# Patient Record
Sex: FEMALE | Race: OTHER | HISPANIC OR LATINO | URBAN - METROPOLITAN AREA
[De-identification: names, ages, dates, MRNs, and addresses within clinical notes are randomized per-mention and may not be internally consistent; named-entity substitution may affect disease eponyms.]

---

## 2019-09-20 ENCOUNTER — EMERGENCY (EMERGENCY)
Facility: HOSPITAL | Age: 24
LOS: 1 days | Discharge: HOME | End: 2019-09-20
Attending: STUDENT IN AN ORGANIZED HEALTH CARE EDUCATION/TRAINING PROGRAM
Payer: MEDICAID

## 2019-09-20 VITALS
WEIGHT: 190.04 LBS | HEIGHT: 67 IN | SYSTOLIC BLOOD PRESSURE: 116 MMHG | OXYGEN SATURATION: 99 % | HEART RATE: 98 BPM | DIASTOLIC BLOOD PRESSURE: 76 MMHG | TEMPERATURE: 98 F | RESPIRATION RATE: 16 BRPM

## 2019-09-20 LAB
APPEARANCE UR: ABNORMAL
BILIRUB UR-MCNC: NEGATIVE — SIGNIFICANT CHANGE UP
COLOR SPEC: YELLOW — SIGNIFICANT CHANGE UP
DIFF PNL FLD: ABNORMAL
GLUCOSE UR QL: NEGATIVE — SIGNIFICANT CHANGE UP
HCG UR QL: NEGATIVE — SIGNIFICANT CHANGE UP
KETONES UR-MCNC: NEGATIVE — SIGNIFICANT CHANGE UP
LEUKOCYTE ESTERASE UR-ACNC: NEGATIVE — SIGNIFICANT CHANGE UP
NITRITE UR-MCNC: NEGATIVE — SIGNIFICANT CHANGE UP
PH UR: 6 — SIGNIFICANT CHANGE UP (ref 5–8)
PROT UR-MCNC: 30 MG/DL
SP GR SPEC: 1.02 — SIGNIFICANT CHANGE UP (ref 1.01–1.02)
UROBILINOGEN FLD QL: NEGATIVE — SIGNIFICANT CHANGE UP

## 2019-09-20 PROCEDURE — 81001 URINALYSIS AUTO W/SCOPE: CPT

## 2019-09-20 PROCEDURE — 81025 URINE PREGNANCY TEST: CPT

## 2019-09-20 PROCEDURE — 73590 X-RAY EXAM OF LOWER LEG: CPT | Mod: 26,LT

## 2019-09-20 PROCEDURE — 73630 X-RAY EXAM OF FOOT: CPT

## 2019-09-20 PROCEDURE — 73610 X-RAY EXAM OF ANKLE: CPT | Mod: 26,LT

## 2019-09-20 PROCEDURE — 73590 X-RAY EXAM OF LOWER LEG: CPT

## 2019-09-20 PROCEDURE — 99284 EMERGENCY DEPT VISIT MOD MDM: CPT

## 2019-09-20 PROCEDURE — 73610 X-RAY EXAM OF ANKLE: CPT

## 2019-09-20 PROCEDURE — 73630 X-RAY EXAM OF FOOT: CPT | Mod: 26,LT

## 2019-09-20 PROCEDURE — 99284 EMERGENCY DEPT VISIT MOD MDM: CPT | Mod: 25

## 2019-09-20 RX ORDER — ACETAMINOPHEN 500 MG
650 TABLET ORAL ONCE
Refills: 0 | Status: COMPLETED | OUTPATIENT
Start: 2019-09-20 | End: 2019-09-20

## 2019-09-20 RX ORDER — IBUPROFEN 200 MG
1 TABLET ORAL
Qty: 28 | Refills: 0
Start: 2019-09-20 | End: 2019-09-26

## 2019-09-20 RX ORDER — ONDANSETRON 8 MG/1
4 TABLET, FILM COATED ORAL ONCE
Refills: 0 | Status: COMPLETED | OUTPATIENT
Start: 2019-09-20 | End: 2019-09-20

## 2019-09-20 RX ORDER — IBUPROFEN 200 MG
600 TABLET ORAL ONCE
Refills: 0 | Status: COMPLETED | OUTPATIENT
Start: 2019-09-20 | End: 2019-09-20

## 2019-09-20 RX ADMIN — ONDANSETRON 4 MILLIGRAM(S): 8 TABLET, FILM COATED ORAL at 08:30

## 2019-09-20 RX ADMIN — Medication 650 MILLIGRAM(S): at 08:30

## 2019-09-20 RX ADMIN — Medication 600 MILLIGRAM(S): at 08:29

## 2019-09-20 NOTE — ED PROVIDER NOTE - CLINICAL SUMMARY MEDICAL DECISION MAKING FREE TEXT BOX
24 y/o F with pmhx of PCOS presents to ED complaining of ankle pain and no other injury. Will treat pain and will obtain X-ray and Upreg and will reassess.

## 2019-09-20 NOTE — ED PROVIDER NOTE - PATIENT PORTAL LINK FT
You can access the FollowMyHealth Patient Portal offered by [facility] by registering at the following website: [email or web address]. By joining [employer]’s FollowMyHealth portal, you will also be able to view your health information using other applications (apps) compatible with our system.

## 2019-09-20 NOTE — ED PROVIDER NOTE - PROGRESS NOTE DETAILS
xray reviewed, no fracture noted. patient ambulatory. given pain med and ortho f.u instruction. return precaution instructed

## 2020-11-04 ENCOUNTER — EMERGENCY (EMERGENCY)
Facility: HOSPITAL | Age: 25
LOS: 1 days | Discharge: HOME | End: 2020-11-04
Attending: EMERGENCY MEDICINE
Payer: MEDICAID

## 2020-11-04 VITALS
RESPIRATION RATE: 16 BRPM | OXYGEN SATURATION: 100 % | HEART RATE: 84 BPM | HEIGHT: 67 IN | SYSTOLIC BLOOD PRESSURE: 134 MMHG | TEMPERATURE: 98 F | DIASTOLIC BLOOD PRESSURE: 88 MMHG | WEIGHT: 199.96 LBS

## 2020-11-04 PROBLEM — E28.2 POLYCYSTIC OVARIAN SYNDROME: Chronic | Status: ACTIVE | Noted: 2019-09-20

## 2020-11-04 LAB
ANION GAP SERPL CALC-SCNC: 6 MMOL/L — SIGNIFICANT CHANGE UP (ref 5–17)
BASOPHILS # BLD AUTO: 0.05 K/UL — SIGNIFICANT CHANGE UP (ref 0–0.2)
BASOPHILS NFR BLD AUTO: 0.5 % — SIGNIFICANT CHANGE UP (ref 0–2)
BUN SERPL-MCNC: 15 MG/DL — SIGNIFICANT CHANGE UP (ref 7–18)
CALCIUM SERPL-MCNC: 8.3 MG/DL — LOW (ref 8.4–10.5)
CHLORIDE SERPL-SCNC: 105 MMOL/L — SIGNIFICANT CHANGE UP (ref 96–108)
CO2 SERPL-SCNC: 28 MMOL/L — SIGNIFICANT CHANGE UP (ref 22–31)
CREAT SERPL-MCNC: 0.96 MG/DL — SIGNIFICANT CHANGE UP (ref 0.5–1.3)
EOSINOPHIL # BLD AUTO: 0.06 K/UL — SIGNIFICANT CHANGE UP (ref 0–0.5)
EOSINOPHIL NFR BLD AUTO: 0.6 % — SIGNIFICANT CHANGE UP (ref 0–6)
GLUCOSE SERPL-MCNC: 94 MG/DL — SIGNIFICANT CHANGE UP (ref 70–99)
HCT VFR BLD CALC: 41.1 % — SIGNIFICANT CHANGE UP (ref 34.5–45)
HGB BLD-MCNC: 13.2 G/DL — SIGNIFICANT CHANGE UP (ref 11.5–15.5)
IMM GRANULOCYTES NFR BLD AUTO: 0.1 % — SIGNIFICANT CHANGE UP (ref 0–1.5)
LYMPHOCYTES # BLD AUTO: 3.19 K/UL — SIGNIFICANT CHANGE UP (ref 1–3.3)
LYMPHOCYTES # BLD AUTO: 33.6 % — SIGNIFICANT CHANGE UP (ref 13–44)
MCHC RBC-ENTMCNC: 28 PG — SIGNIFICANT CHANGE UP (ref 27–34)
MCHC RBC-ENTMCNC: 32.1 GM/DL — SIGNIFICANT CHANGE UP (ref 32–36)
MCV RBC AUTO: 87.3 FL — SIGNIFICANT CHANGE UP (ref 80–100)
MONOCYTES # BLD AUTO: 0.59 K/UL — SIGNIFICANT CHANGE UP (ref 0–0.9)
MONOCYTES NFR BLD AUTO: 6.2 % — SIGNIFICANT CHANGE UP (ref 2–14)
NEUTROPHILS # BLD AUTO: 5.59 K/UL — SIGNIFICANT CHANGE UP (ref 1.8–7.4)
NEUTROPHILS NFR BLD AUTO: 59 % — SIGNIFICANT CHANGE UP (ref 43–77)
NRBC # BLD: 0 /100 WBCS — SIGNIFICANT CHANGE UP (ref 0–0)
PLATELET # BLD AUTO: 269 K/UL — SIGNIFICANT CHANGE UP (ref 150–400)
POTASSIUM SERPL-MCNC: 3.7 MMOL/L — SIGNIFICANT CHANGE UP (ref 3.5–5.3)
POTASSIUM SERPL-SCNC: 3.7 MMOL/L — SIGNIFICANT CHANGE UP (ref 3.5–5.3)
RBC # BLD: 4.71 M/UL — SIGNIFICANT CHANGE UP (ref 3.8–5.2)
RBC # FLD: 14 % — SIGNIFICANT CHANGE UP (ref 10.3–14.5)
SODIUM SERPL-SCNC: 139 MMOL/L — SIGNIFICANT CHANGE UP (ref 135–145)
WBC # BLD: 9.49 K/UL — SIGNIFICANT CHANGE UP (ref 3.8–10.5)
WBC # FLD AUTO: 9.49 K/UL — SIGNIFICANT CHANGE UP (ref 3.8–10.5)

## 2020-11-04 PROCEDURE — 80048 BASIC METABOLIC PNL TOTAL CA: CPT

## 2020-11-04 PROCEDURE — 99283 EMERGENCY DEPT VISIT LOW MDM: CPT

## 2020-11-04 PROCEDURE — 36415 COLL VENOUS BLD VENIPUNCTURE: CPT

## 2020-11-04 PROCEDURE — 85025 COMPLETE CBC W/AUTO DIFF WBC: CPT

## 2020-11-04 NOTE — ED PROVIDER NOTE - OBJECTIVE STATEMENT
26 y/o F patient with a significant PMHx of PCOS and no significant PSHx presents to the ED with x3 episodes of rectal bleeding. Patient describes her bleeding as a flow. Patient notes a history of constipation but says she hasn't been constipated in the past week. Patient endorses frequent lightheadedness. Patient denies trauma to the rectum and any other complaints. Patient denies being Rx blood thinners. NKDA.

## 2020-11-04 NOTE — ED PROVIDER NOTE - NSFOLLOWUPINSTRUCTIONS_ED_ALL_ED_FT
Call Patient     Hemorrhoids    WHAT YOU NEED TO KNOW:    Hemorrhoids are swollen blood vessels inside your rectum (internal hemorrhoids) or on your anus (external hemorrhoids). Sometimes a hemorrhoid may prolapse. This means it extends out of your anus.    DISCHARGE INSTRUCTIONS:    Seek care immediately if:   •You have severe pain in your rectum or around your anus.      •You have severe pain in your abdomen and you are vomiting.       •You have bleeding from your anus that soaks through your underwear.       Contact your healthcare provider if:   •You have frequent and painful bowel movements.      •Your hemorrhoid looks or feels more swollen than usual.       •You do not have a bowel movement for 2 days or more.       •You see or feel tissue coming through your anus.       •You have questions or concerns about your condition or care.      Medicines: You may need any of the following:   •Medicine may be given to decrease pain, swelling, and itching. The medicine may come as a pad, cream, or ointment.       •Stool softeners help treat or prevent constipation.       •NSAIDs, such as ibuprofen, help decrease swelling, pain, and fever. NSAIDs can cause stomach bleeding or kidney problems in certain people. If you take blood thinner medicine, always ask your healthcare provider if NSAIDs are safe for you. Always read the medicine label and follow directions.      •Take your medicine as directed. Contact your healthcare provider if you think your medicine is not helping or if you have side effects. Tell him or her if you are allergic to any medicine. Keep a list of the medicines, vitamins, and herbs you take. Include the amounts, and when and why you take them. Bring the list or the pill bottles to follow-up visits. Carry your medicine list with you in case of an emergency.      Manage your symptoms:   •Apply ice on your anus for 15 to 20 minutes every hour or as directed. Use an ice pack, or put crushed ice in a plastic bag. Cover it with a towel before you apply it to your anus. Ice helps prevent tissue damage and decreases swelling and pain.      •Take a sitz bath. Fill a bathtub with 4 to 6 inches of warm water. You may also use a sitz bath pan that fits inside a toilet bowl. Sit in the sitz bath for 15 minutes. Do this 3 times a day, and after each bowel movement. The warm water can help decrease pain and swelling.       •Keep your anal area clean. Gently wash the area with warm water daily. Soap may irritate the area. After a bowel movement, wipe with moist towelettes or wet toilet paper. Dry toilet paper can irritate the area.       Prevent hemorrhoids:   •Do not strain to have a bowel movement. Do not sit on the toilet too long. These actions can increase pressure on the tissues in your rectum and anus.       •Drink plenty of liquids. Liquids can help prevent constipation. Ask how much liquid to drink each day and which liquids are best for you.       •Eat a variety of high-fiber foods. Examples include fruits, vegetables, and whole grains. Ask your healthcare provider how much fiber you need each day. You may need to take a fiber supplement.              •Exercise as directed. Exercise, such as walking, may make it easier to have a bowel movement. Ask your healthcare provider to help you create an exercise plan.       •Do not have anal sex. Anal sex can weaken the skin around your rectum and anus.       •Avoid heavy lifting. This can cause straining and increase your risk for another hemorrhoid.       Follow up with your healthcare provider as directed: Write down your questions so you remember to ask them during your visits.

## 2020-11-04 NOTE — ED ADULT TRIAGE NOTE - CHIEF COMPLAINT QUOTE
C/o I had a BM yesterday and I saw a lot of blood, it occurred again this morning. Denies abdominal pain

## 2020-11-04 NOTE — ED PROVIDER NOTE - CLINICAL SUMMARY MEDICAL DECISION MAKING FREE TEXT BOX
Labs and consult GI for quick outpatient follow up appointment. Pt vitals within normal limits, unlikely intestinal bleed.

## 2020-11-04 NOTE — ED ADULT NURSE NOTE - OBJECTIVE STATEMENT
Pt. c/o bloody stools. Pt. stated that when she goes to the bathroom there "is a flow of blood" that happened 3 times. Pt. stated that she feels a little light headed. Pt. able to ambulate with steady gait.

## 2020-11-04 NOTE — ED PROVIDER NOTE - CARE PROVIDER_API CALL
Prabhu Tejada)  Medicine  [address] [place] [address], [address] [place] [address]  Phone: [phone]  Fax: [phone]  Follow Up Time:

## 2020-11-04 NOTE — ED PROVIDER NOTE - PATIENT PORTAL LINK FT
You can access the FollowMyHealth Patient Portal offered by [facility] by registering at the following website: [email or web address]. By joining [employer]’s FollowMyHealth portal, you will also be able to view your health information using other applications (apps) compatible with our system.

## 2021-04-15 ENCOUNTER — EMERGENCY (EMERGENCY)
Facility: HOSPITAL | Age: 26
LOS: 1 days | Discharge: HOME | End: 2021-04-15
Attending: EMERGENCY MEDICINE
Payer: MEDICAID

## 2021-04-15 VITALS
HEIGHT: 67 IN | OXYGEN SATURATION: 97 % | HEART RATE: 81 BPM | DIASTOLIC BLOOD PRESSURE: 76 MMHG | SYSTOLIC BLOOD PRESSURE: 122 MMHG | RESPIRATION RATE: 17 BRPM | TEMPERATURE: 98 F

## 2021-04-15 LAB
ALBUMIN SERPL ELPH-MCNC: 3.4 G/DL — LOW (ref 3.5–5)
ALP SERPL-CCNC: 112 U/L — SIGNIFICANT CHANGE UP (ref 40–120)
ALT FLD-CCNC: 55 U/L DA — SIGNIFICANT CHANGE UP (ref 10–60)
ANION GAP SERPL CALC-SCNC: 6 MMOL/L — SIGNIFICANT CHANGE UP (ref 5–17)
APPEARANCE UR: CLEAR — SIGNIFICANT CHANGE UP
AST SERPL-CCNC: 25 U/L — SIGNIFICANT CHANGE UP (ref 10–40)
BACTERIA # UR AUTO: ABNORMAL /HPF
BILIRUB SERPL-MCNC: 0.4 MG/DL — SIGNIFICANT CHANGE UP (ref 0.2–1.2)
BILIRUB UR-MCNC: NEGATIVE — SIGNIFICANT CHANGE UP
BUN SERPL-MCNC: 7 MG/DL — SIGNIFICANT CHANGE UP (ref 7–18)
CALCIUM SERPL-MCNC: 9.5 MG/DL — SIGNIFICANT CHANGE UP (ref 8.4–10.5)
CHLORIDE SERPL-SCNC: 102 MMOL/L — SIGNIFICANT CHANGE UP (ref 96–108)
CO2 SERPL-SCNC: 28 MMOL/L — SIGNIFICANT CHANGE UP (ref 22–31)
COLOR SPEC: YELLOW — SIGNIFICANT CHANGE UP
CREAT SERPL-MCNC: 0.78 MG/DL — SIGNIFICANT CHANGE UP (ref 0.5–1.3)
DIFF PNL FLD: ABNORMAL
EPI CELLS # UR: ABNORMAL /HPF
GLUCOSE SERPL-MCNC: 103 MG/DL — HIGH (ref 70–99)
GLUCOSE UR QL: NEGATIVE — SIGNIFICANT CHANGE UP
HCG UR QL: NEGATIVE — SIGNIFICANT CHANGE UP
HCT VFR BLD CALC: 45.5 % — HIGH (ref 34.5–45)
HGB BLD-MCNC: 14.7 G/DL — SIGNIFICANT CHANGE UP (ref 11.5–15.5)
KETONES UR-MCNC: ABNORMAL
LEUKOCYTE ESTERASE UR-ACNC: NEGATIVE — SIGNIFICANT CHANGE UP
MCHC RBC-ENTMCNC: 28.3 PG — SIGNIFICANT CHANGE UP (ref 27–34)
MCHC RBC-ENTMCNC: 32.3 GM/DL — SIGNIFICANT CHANGE UP (ref 32–36)
MCV RBC AUTO: 87.5 FL — SIGNIFICANT CHANGE UP (ref 80–100)
NITRITE UR-MCNC: NEGATIVE — SIGNIFICANT CHANGE UP
NRBC # BLD: 0 /100 WBCS — SIGNIFICANT CHANGE UP (ref 0–0)
PH UR: 7 — SIGNIFICANT CHANGE UP (ref 5–8)
PLATELET # BLD AUTO: 267 K/UL — SIGNIFICANT CHANGE UP (ref 150–400)
POTASSIUM SERPL-MCNC: 4.3 MMOL/L — SIGNIFICANT CHANGE UP (ref 3.5–5.3)
POTASSIUM SERPL-SCNC: 4.3 MMOL/L — SIGNIFICANT CHANGE UP (ref 3.5–5.3)
PROT SERPL-MCNC: 7.8 G/DL — SIGNIFICANT CHANGE UP (ref 6–8.3)
PROT UR-MCNC: NEGATIVE — SIGNIFICANT CHANGE UP
RBC # BLD: 5.2 M/UL — SIGNIFICANT CHANGE UP (ref 3.8–5.2)
RBC # FLD: 13.6 % — SIGNIFICANT CHANGE UP (ref 10.3–14.5)
RBC CASTS # UR COMP ASSIST: SIGNIFICANT CHANGE UP /HPF (ref 0–2)
SARS-COV-2 RNA SPEC QL NAA+PROBE: SIGNIFICANT CHANGE UP
SODIUM SERPL-SCNC: 136 MMOL/L — SIGNIFICANT CHANGE UP (ref 135–145)
SP GR SPEC: 1.01 — SIGNIFICANT CHANGE UP (ref 1.01–1.02)
UROBILINOGEN FLD QL: NEGATIVE — SIGNIFICANT CHANGE UP
WBC # BLD: 13.59 K/UL — HIGH (ref 3.8–10.5)
WBC # FLD AUTO: 13.59 K/UL — HIGH (ref 3.8–10.5)
WBC UR QL: SIGNIFICANT CHANGE UP /HPF (ref 0–5)

## 2021-04-15 PROCEDURE — 85027 COMPLETE CBC AUTOMATED: CPT

## 2021-04-15 PROCEDURE — 99283 EMERGENCY DEPT VISIT LOW MDM: CPT

## 2021-04-15 PROCEDURE — 81001 URINALYSIS AUTO W/SCOPE: CPT

## 2021-04-15 PROCEDURE — 87635 SARS-COV-2 COVID-19 AMP PRB: CPT

## 2021-04-15 PROCEDURE — 36415 COLL VENOUS BLD VENIPUNCTURE: CPT

## 2021-04-15 PROCEDURE — 99284 EMERGENCY DEPT VISIT MOD MDM: CPT

## 2021-04-15 PROCEDURE — 80053 COMPREHEN METABOLIC PANEL: CPT

## 2021-04-15 PROCEDURE — 81025 URINE PREGNANCY TEST: CPT

## 2021-04-15 RX ORDER — IBUPROFEN 200 MG
600 TABLET ORAL ONCE
Refills: 0 | Status: COMPLETED | OUTPATIENT
Start: 2021-04-15 | End: 2021-04-15

## 2021-04-15 RX ORDER — CEPHALEXIN 500 MG
1 CAPSULE ORAL
Qty: 21 | Refills: 0
Start: 2021-04-15 | End: 2021-04-21

## 2021-04-15 RX ORDER — IBUPROFEN 200 MG
1 TABLET ORAL
Qty: 21 | Refills: 0
Start: 2021-04-15 | End: 2021-04-21

## 2021-04-15 RX ADMIN — Medication 600 MILLIGRAM(S): at 13:17

## 2021-04-15 NOTE — ED PROVIDER NOTE - OBJECTIVE STATEMENT
24 y/o female with no significant PMHx presents to the ED c/o COVID-like Sx x yesterday. Pt notes over 24 hours of frontal HA, lower back pain, neck pain, myalgia, fatigue, ear pain and diarrhea. Pt notes last COVID test x 2 months ago, negative. Pt denies fever, chills, or any other complaints. NKDA.

## 2021-04-15 NOTE — ED PROVIDER NOTE - HIV OFFER
Left message on voice mail for pt to call clinic back  
Pt left message on voice mail   surgery on 2/26   Has been on depo provera to stop menstrual cycles, will she still need to continue this  
Previously Declined (within the last year)

## 2021-04-15 NOTE — ED PROVIDER NOTE - CROS ED NEURO POS
40 y/o Female with a PMHx of left ovarian cyst in December 2019presents to the ED c/o right groin pain radiating to her right back this morning when she woke up. She had an acupuncture appointment this morning which she went to. She felt better afterwards but then felt pain again. Endorses nausea which has subsided, denies fever, chills, vaginal discharge, and dysuria. She took Tylenol at 10am. Of note, patient attends acupuncture sessions for fertility reasons, she is not taking any fertility medications. fatigue/HEADACHE

## 2021-04-15 NOTE — ED PROVIDER NOTE - CLINICAL SUMMARY MEDICAL DECISION MAKING FREE TEXT BOX
25F with HA, diarrhea, myalgia x yesterday. R/o COVID. COVID screening, basic labs and reeval. 25F with HA, diarrhea, myalgia x yesterday. R/o COVID. COVID screening, basic labs and reeval.will treat for uti

## 2021-04-15 NOTE — ED PROVIDER NOTE - PATIENT PORTAL LINK FT
You can access the FollowMyHealth Patient Portal offered by [facility] by registering at the following website: [email or web address]. By joining [employer]’s FollowMyHealth portal, you will also be able to view your health information using other applications (apps) compatible with our system.

## 2021-11-01 ENCOUNTER — OUTPATIENT (OUTPATIENT)
Dept: OUTPATIENT SERVICES | Facility: HOSPITAL | Age: 26
LOS: 1 days | End: 2021-11-01
Payer: MEDICAID

## 2021-11-06 ENCOUNTER — EMERGENCY (EMERGENCY)
Facility: HOSPITAL | Age: 26
LOS: 1 days | Discharge: HOME | End: 2021-11-06
Attending: EMERGENCY MEDICINE
Payer: MEDICAID

## 2021-11-06 VITALS
DIASTOLIC BLOOD PRESSURE: 72 MMHG | OXYGEN SATURATION: 99 % | SYSTOLIC BLOOD PRESSURE: 107 MMHG | RESPIRATION RATE: 18 BRPM | TEMPERATURE: 98 F | HEART RATE: 76 BPM

## 2021-11-06 VITALS
TEMPERATURE: 98 F | SYSTOLIC BLOOD PRESSURE: 116 MMHG | OXYGEN SATURATION: 98 % | RESPIRATION RATE: 16 BRPM | HEIGHT: 67 IN | DIASTOLIC BLOOD PRESSURE: 78 MMHG | HEART RATE: 86 BPM

## 2021-11-06 LAB
ALBUMIN SERPL ELPH-MCNC: 3.4 G/DL — LOW (ref 3.5–5)
ALP SERPL-CCNC: 94 U/L — SIGNIFICANT CHANGE UP (ref 40–120)
ALT FLD-CCNC: 18 U/L DA — SIGNIFICANT CHANGE UP (ref 10–60)
ANION GAP SERPL CALC-SCNC: 5 MMOL/L — SIGNIFICANT CHANGE UP (ref 5–17)
AST SERPL-CCNC: 11 U/L — SIGNIFICANT CHANGE UP (ref 10–40)
BASOPHILS # BLD AUTO: 0.06 K/UL — SIGNIFICANT CHANGE UP (ref 0–0.2)
BASOPHILS NFR BLD AUTO: 0.6 % — SIGNIFICANT CHANGE UP (ref 0–2)
BILIRUB SERPL-MCNC: 0.2 MG/DL — SIGNIFICANT CHANGE UP (ref 0.2–1.2)
BUN SERPL-MCNC: 12 MG/DL — SIGNIFICANT CHANGE UP (ref 7–18)
CALCIUM SERPL-MCNC: 9.3 MG/DL — SIGNIFICANT CHANGE UP (ref 8.4–10.5)
CHLORIDE SERPL-SCNC: 108 MMOL/L — SIGNIFICANT CHANGE UP (ref 96–108)
CO2 SERPL-SCNC: 27 MMOL/L — SIGNIFICANT CHANGE UP (ref 22–31)
CREAT SERPL-MCNC: 0.79 MG/DL — SIGNIFICANT CHANGE UP (ref 0.5–1.3)
D DIMER BLD IA.RAPID-MCNC: <150 NG/ML DDU — SIGNIFICANT CHANGE UP
EOSINOPHIL # BLD AUTO: 0.09 K/UL — SIGNIFICANT CHANGE UP (ref 0–0.5)
EOSINOPHIL NFR BLD AUTO: 0.9 % — SIGNIFICANT CHANGE UP (ref 0–6)
GLUCOSE SERPL-MCNC: 98 MG/DL — SIGNIFICANT CHANGE UP (ref 70–99)
HCT VFR BLD CALC: 42.1 % — SIGNIFICANT CHANGE UP (ref 34.5–45)
HGB BLD-MCNC: 13.4 G/DL — SIGNIFICANT CHANGE UP (ref 11.5–15.5)
IMM GRANULOCYTES NFR BLD AUTO: 0.2 % — SIGNIFICANT CHANGE UP (ref 0–1.5)
LYMPHOCYTES # BLD AUTO: 3.55 K/UL — HIGH (ref 1–3.3)
LYMPHOCYTES # BLD AUTO: 33.6 % — SIGNIFICANT CHANGE UP (ref 13–44)
MCHC RBC-ENTMCNC: 26.8 PG — LOW (ref 27–34)
MCHC RBC-ENTMCNC: 31.8 GM/DL — LOW (ref 32–36)
MCV RBC AUTO: 84.2 FL — SIGNIFICANT CHANGE UP (ref 80–100)
MONOCYTES # BLD AUTO: 0.53 K/UL — SIGNIFICANT CHANGE UP (ref 0–0.9)
MONOCYTES NFR BLD AUTO: 5 % — SIGNIFICANT CHANGE UP (ref 2–14)
NEUTROPHILS # BLD AUTO: 6.3 K/UL — SIGNIFICANT CHANGE UP (ref 1.8–7.4)
NEUTROPHILS NFR BLD AUTO: 59.7 % — SIGNIFICANT CHANGE UP (ref 43–77)
NRBC # BLD: 0 /100 WBCS — SIGNIFICANT CHANGE UP (ref 0–0)
PLATELET # BLD AUTO: 369 K/UL — SIGNIFICANT CHANGE UP (ref 150–400)
POTASSIUM SERPL-MCNC: 4.6 MMOL/L — SIGNIFICANT CHANGE UP (ref 3.5–5.3)
POTASSIUM SERPL-SCNC: 4.6 MMOL/L — SIGNIFICANT CHANGE UP (ref 3.5–5.3)
PROT SERPL-MCNC: 7.4 G/DL — SIGNIFICANT CHANGE UP (ref 6–8.3)
RBC # BLD: 5 M/UL — SIGNIFICANT CHANGE UP (ref 3.8–5.2)
RBC # FLD: 14.6 % — HIGH (ref 10.3–14.5)
SODIUM SERPL-SCNC: 140 MMOL/L — SIGNIFICANT CHANGE UP (ref 135–145)
T4 AB SER-ACNC: 8.3 UG/DL — SIGNIFICANT CHANGE UP (ref 4.6–12)
TROPONIN I, HIGH SENSITIVITY RESULT: 10.5 NG/L — SIGNIFICANT CHANGE UP
TSH SERPL-MCNC: 0.91 UU/ML — SIGNIFICANT CHANGE UP (ref 0.34–4.82)
WBC # BLD: 10.55 K/UL — HIGH (ref 3.8–10.5)
WBC # FLD AUTO: 10.55 K/UL — HIGH (ref 3.8–10.5)

## 2021-11-06 PROCEDURE — 80053 COMPREHEN METABOLIC PANEL: CPT

## 2021-11-06 PROCEDURE — 99284 EMERGENCY DEPT VISIT MOD MDM: CPT

## 2021-11-06 PROCEDURE — 84480 ASSAY TRIIODOTHYRONINE (T3): CPT

## 2021-11-06 PROCEDURE — 85379 FIBRIN DEGRADATION QUANT: CPT

## 2021-11-06 PROCEDURE — 84484 ASSAY OF TROPONIN QUANT: CPT

## 2021-11-06 PROCEDURE — 85025 COMPLETE CBC W/AUTO DIFF WBC: CPT

## 2021-11-06 PROCEDURE — 99285 EMERGENCY DEPT VISIT HI MDM: CPT

## 2021-11-06 PROCEDURE — 84443 ASSAY THYROID STIM HORMONE: CPT

## 2021-11-06 PROCEDURE — 84436 ASSAY OF TOTAL THYROXINE: CPT

## 2021-11-06 PROCEDURE — 93005 ELECTROCARDIOGRAM TRACING: CPT

## 2021-11-06 PROCEDURE — 36415 COLL VENOUS BLD VENIPUNCTURE: CPT

## 2021-11-06 RX ORDER — ALPRAZOLAM 0.25 MG
0.25 TABLET ORAL ONCE
Refills: 0 | Status: DISCONTINUED | OUTPATIENT
Start: 2021-11-06 | End: 2021-11-06

## 2021-11-06 RX ADMIN — Medication 0.25 MILLIGRAM(S): at 19:34

## 2021-11-06 NOTE — ED PROVIDER NOTE - CLINICAL SUMMARY MEDICAL DECISION MAKING FREE TEXT BOX
EKG showed RBBB with nonspecific ST-T changes. Will obtain cardiac enzymes. Will monitor pt. Will reassess. EKG showed RBBB with nonspecific ST-T changes. Will obtain cardiac enzymes. Will monitor pt. Will reassess.  lab work unremarkable thyroid tests wnl  patient request referral to psychiatrist

## 2021-11-06 NOTE — ED PROVIDER NOTE - NSFOLLOWUPCLINICS_GEN_ALL_ED_FT
[facility]  Pediatric Psychiatry  [address] [address]  Phone: [phone]  Fax: [phone]  Follow Up Time: 1-3 Days

## 2021-11-06 NOTE — ED PROVIDER NOTE - PATIENT PORTAL LINK FT
You can access the FollowMyHealth Patient Portal offered by [facility] by registering at the following website: [email or web address]. By joining [employer]’s FollowMyHealth portal, you will also be able to view your health information using other applications (apps) compatible with our system.

## 2021-11-06 NOTE — ED PROVIDER NOTE - NSFOLLOWUPINSTRUCTIONS_ED_ALL_ED_FT
Log Out.      [employer] CareNotes®     :  [employer]  	                          Log Out.      [employer] CareNotes®     :  [employer]  	                       ANXIETY - AfterCare(R) Instructions(ER/ED)           Anxiety    WHAT YOU NEED TO KNOW:    Anxiety is a condition that causes you to feel extremely worried or nervous. The feelings are so strong that they can cause problems with your daily activities or sleep. Anxiety may be triggered by something you fear, or it may happen without a cause. Family or work stress, smoking, caffeine, and alcohol can increase your risk for anxiety. Certain medicines or health conditions can also increase your risk. Anxiety can become a long-term condition if it is not managed or treated.    DISCHARGE INSTRUCTIONS:    Call your local emergency number (911 in the [place]) if:   •You have chest pain, tightness, or heaviness that may spread to your shoulders, arms, jaw, neck, or back.      •You feel like hurting yourself or someone else.      Call your doctor if:   •Your symptoms get worse or do not get better with treatment.      •Your anxiety keeps you from doing your regular daily activities.      •You have new symptoms since your last visit.      •You have questions or concerns about your condition or care.      Medicines:   •Medicines may be given to help you feel more calm and relaxed, and decrease your symptoms.      •Take your medicine as directed. Contact your healthcare provider if you think your medicine is not helping or if you have side effects. Tell him of her if you are allergic to any medicine. Keep a list of the medicines, vitamins, and herbs you take. Include the amounts, and when and why you take them. Bring the list or the pill bottles to follow-up visits. Carry your medicine list with you in case of an emergency.

## 2021-11-06 NOTE — ED ADULT NURSE NOTE - OBJECTIVE STATEMENT
Pt presents to ED with c/o  chest pain/migraines for 1x month, palpitation Pt presents to ED with c/o  chest pain/migraines for 1x month, palpitation for 2x weeks. Pt denies chest pain at this time.

## 2021-11-06 NOTE — ED PROVIDER NOTE - OBJECTIVE STATEMENT
25 y/o female h/o PCOS, comes in c/o palpitations. Pt states she is under stress and she feels her heart racing. Also relates some chest pain. No N/V. Denies using tobacco products or drugs.

## 2021-11-07 LAB — T3 SERPL-MCNC: 95 NG/DL — SIGNIFICANT CHANGE UP (ref 80–200)

## 2021-11-08 ENCOUNTER — INPATIENT (INPATIENT)
Facility: HOSPITAL | Age: 26
LOS: 8 days | Discharge: HOME | End: 2021-11-17
Attending: PSYCHIATRY & NEUROLOGY | Admitting: PSYCHIATRY & NEUROLOGY
Payer: MEDICAID

## 2021-11-08 VITALS
TEMPERATURE: 98 F | HEART RATE: 87 BPM | HEIGHT: 67 IN | OXYGEN SATURATION: 100 % | RESPIRATION RATE: 16 BRPM | SYSTOLIC BLOOD PRESSURE: 120 MMHG | DIASTOLIC BLOOD PRESSURE: 68 MMHG

## 2021-11-08 DIAGNOSIS — F33.3 MAJOR DEPRESSIVE DISORDER, RECURRENT, SEVERE WITH PSYCHOTIC SYMPTOMS: ICD-10-CM

## 2021-11-08 LAB
ALBUMIN SERPL ELPH-MCNC: 4.2 G/DL — SIGNIFICANT CHANGE UP (ref 3.3–5)
ALP SERPL-CCNC: 94 U/L — SIGNIFICANT CHANGE UP (ref 40–120)
ALT FLD-CCNC: 10 U/L — SIGNIFICANT CHANGE UP (ref 4–33)
ANION GAP SERPL CALC-SCNC: 9 MMOL/L — SIGNIFICANT CHANGE UP (ref 7–14)
APPEARANCE UR: ABNORMAL
AST SERPL-CCNC: 13 U/L — SIGNIFICANT CHANGE UP (ref 4–32)
B PERT DNA SPEC QL NAA+PROBE: SIGNIFICANT CHANGE UP
B PERT+PARAPERT DNA PNL SPEC NAA+PROBE: SIGNIFICANT CHANGE UP
BASOPHILS # BLD AUTO: 0.03 K/UL — SIGNIFICANT CHANGE UP (ref 0–0.2)
BASOPHILS NFR BLD AUTO: 0.3 % — SIGNIFICANT CHANGE UP (ref 0–2)
BILIRUB SERPL-MCNC: 0.3 MG/DL — SIGNIFICANT CHANGE UP (ref 0.2–1.2)
BILIRUB UR-MCNC: NEGATIVE — SIGNIFICANT CHANGE UP
BORDETELLA PARAPERTUSSIS (RAPRVP): SIGNIFICANT CHANGE UP
BUN SERPL-MCNC: 11 MG/DL — SIGNIFICANT CHANGE UP (ref 7–23)
C PNEUM DNA SPEC QL NAA+PROBE: SIGNIFICANT CHANGE UP
CALCIUM SERPL-MCNC: 9 MG/DL — SIGNIFICANT CHANGE UP (ref 8.4–10.5)
CHLORIDE SERPL-SCNC: 102 MMOL/L — SIGNIFICANT CHANGE UP (ref 98–107)
CO2 SERPL-SCNC: 26 MMOL/L — SIGNIFICANT CHANGE UP (ref 22–31)
COLOR SPEC: YELLOW — SIGNIFICANT CHANGE UP
COVID-19 SPIKE DOMAIN AB INTERP: POSITIVE
COVID-19 SPIKE DOMAIN ANTIBODY RESULT: >250 U/ML — HIGH
CREAT SERPL-MCNC: 0.94 MG/DL — SIGNIFICANT CHANGE UP (ref 0.5–1.3)
DIFF PNL FLD: ABNORMAL
EOSINOPHIL # BLD AUTO: 0.05 K/UL — SIGNIFICANT CHANGE UP (ref 0–0.5)
EOSINOPHIL NFR BLD AUTO: 0.6 % — SIGNIFICANT CHANGE UP (ref 0–6)
FLUAV SUBTYP SPEC NAA+PROBE: SIGNIFICANT CHANGE UP
FLUBV RNA SPEC QL NAA+PROBE: SIGNIFICANT CHANGE UP
GLUCOSE SERPL-MCNC: 88 MG/DL — SIGNIFICANT CHANGE UP (ref 70–99)
GLUCOSE UR QL: NEGATIVE — SIGNIFICANT CHANGE UP
HADV DNA SPEC QL NAA+PROBE: SIGNIFICANT CHANGE UP
HCG SERPL-ACNC: <5 MIU/ML — SIGNIFICANT CHANGE UP
HCOV 229E RNA SPEC QL NAA+PROBE: SIGNIFICANT CHANGE UP
HCOV HKU1 RNA SPEC QL NAA+PROBE: SIGNIFICANT CHANGE UP
HCOV NL63 RNA SPEC QL NAA+PROBE: SIGNIFICANT CHANGE UP
HCOV OC43 RNA SPEC QL NAA+PROBE: SIGNIFICANT CHANGE UP
HCT VFR BLD CALC: 42.7 % — SIGNIFICANT CHANGE UP (ref 34.5–45)
HGB BLD-MCNC: 13.4 G/DL — SIGNIFICANT CHANGE UP (ref 11.5–15.5)
HMPV RNA SPEC QL NAA+PROBE: SIGNIFICANT CHANGE UP
HPIV1 RNA SPEC QL NAA+PROBE: SIGNIFICANT CHANGE UP
HPIV2 RNA SPEC QL NAA+PROBE: SIGNIFICANT CHANGE UP
HPIV3 RNA SPEC QL NAA+PROBE: SIGNIFICANT CHANGE UP
HPIV4 RNA SPEC QL NAA+PROBE: SIGNIFICANT CHANGE UP
IANC: 5.3 K/UL — SIGNIFICANT CHANGE UP (ref 1.5–8.5)
IMM GRANULOCYTES NFR BLD AUTO: 0.2 % — SIGNIFICANT CHANGE UP (ref 0–1.5)
KETONES UR-MCNC: NEGATIVE — SIGNIFICANT CHANGE UP
LEUKOCYTE ESTERASE UR-ACNC: NEGATIVE — SIGNIFICANT CHANGE UP
LYMPHOCYTES # BLD AUTO: 2.97 K/UL — SIGNIFICANT CHANGE UP (ref 1–3.3)
LYMPHOCYTES # BLD AUTO: 33.1 % — SIGNIFICANT CHANGE UP (ref 13–44)
M PNEUMO DNA SPEC QL NAA+PROBE: SIGNIFICANT CHANGE UP
MCHC RBC-ENTMCNC: 26.6 PG — LOW (ref 27–34)
MCHC RBC-ENTMCNC: 31.4 GM/DL — LOW (ref 32–36)
MCV RBC AUTO: 84.7 FL — SIGNIFICANT CHANGE UP (ref 80–100)
MONOCYTES # BLD AUTO: 0.59 K/UL — SIGNIFICANT CHANGE UP (ref 0–0.9)
MONOCYTES NFR BLD AUTO: 6.6 % — SIGNIFICANT CHANGE UP (ref 2–14)
NEUTROPHILS # BLD AUTO: 5.3 K/UL — SIGNIFICANT CHANGE UP (ref 1.8–7.4)
NEUTROPHILS NFR BLD AUTO: 59.2 % — SIGNIFICANT CHANGE UP (ref 43–77)
NITRITE UR-MCNC: NEGATIVE — SIGNIFICANT CHANGE UP
NRBC # BLD: 0 /100 WBCS — SIGNIFICANT CHANGE UP
NRBC # FLD: 0 K/UL — SIGNIFICANT CHANGE UP
PCP SPEC-MCNC: SIGNIFICANT CHANGE UP
PH UR: 6.5 — SIGNIFICANT CHANGE UP (ref 5–8)
PLATELET # BLD AUTO: 342 K/UL — SIGNIFICANT CHANGE UP (ref 150–400)
POTASSIUM SERPL-MCNC: 3.9 MMOL/L — SIGNIFICANT CHANGE UP (ref 3.5–5.3)
POTASSIUM SERPL-SCNC: 3.9 MMOL/L — SIGNIFICANT CHANGE UP (ref 3.5–5.3)
PROT SERPL-MCNC: 7 G/DL — SIGNIFICANT CHANGE UP (ref 6–8.3)
PROT UR-MCNC: ABNORMAL
RAPID RVP RESULT: SIGNIFICANT CHANGE UP
RBC # BLD: 5.04 M/UL — SIGNIFICANT CHANGE UP (ref 3.8–5.2)
RBC # FLD: 14.7 % — HIGH (ref 10.3–14.5)
RSV RNA SPEC QL NAA+PROBE: SIGNIFICANT CHANGE UP
RV+EV RNA SPEC QL NAA+PROBE: SIGNIFICANT CHANGE UP
SARS-COV-2 IGG+IGM SERPL QL IA: >250 U/ML — HIGH
SARS-COV-2 IGG+IGM SERPL QL IA: POSITIVE
SARS-COV-2 RNA SPEC QL NAA+PROBE: SIGNIFICANT CHANGE UP
SODIUM SERPL-SCNC: 137 MMOL/L — SIGNIFICANT CHANGE UP (ref 135–145)
SP GR SPEC: 1.03 — SIGNIFICANT CHANGE UP (ref 1–1.05)
TOXICOLOGY SCREEN, DRUGS OF ABUSE, SERUM RESULT: SIGNIFICANT CHANGE UP
TSH SERPL-MCNC: 1.55 UIU/ML — SIGNIFICANT CHANGE UP (ref 0.27–4.2)
UROBILINOGEN FLD QL: SIGNIFICANT CHANGE UP
WBC # BLD: 8.96 K/UL — SIGNIFICANT CHANGE UP (ref 3.8–10.5)
WBC # FLD AUTO: 8.96 K/UL — SIGNIFICANT CHANGE UP (ref 3.8–10.5)

## 2021-11-08 PROCEDURE — 93010 ELECTROCARDIOGRAM REPORT: CPT

## 2021-11-08 PROCEDURE — 99285 EMERGENCY DEPT VISIT HI MDM: CPT

## 2021-11-08 PROCEDURE — 99285 EMERGENCY DEPT VISIT HI MDM: CPT | Mod: 25

## 2021-11-08 PROCEDURE — 70450 CT HEAD/BRAIN W/O DYE: CPT | Mod: 26,MA

## 2021-11-08 RX ORDER — LANOLIN ALCOHOL/MO/W.PET/CERES
3 CREAM (GRAM) TOPICAL AT BEDTIME
Refills: 0 | Status: DISCONTINUED | OUTPATIENT
Start: 2021-11-08 | End: 2021-11-09

## 2021-11-08 RX ORDER — HYDROXYZINE HCL 10 MG
50 TABLET ORAL EVERY 6 HOURS
Refills: 0 | Status: DISCONTINUED | OUTPATIENT
Start: 2021-11-08 | End: 2021-11-17

## 2021-11-08 RX ADMIN — Medication 3 MILLIGRAM(S): at 20:56

## 2021-11-08 NOTE — ED BEHAVIORAL HEALTH ASSESSMENT NOTE - OTHER PAST PSYCHIATRIC HISTORY (INCLUDE DETAILS REGARDING ONSET, COURSE OF ILLNESS, INPATIENT/OUTPATIENT TREATMENT)
Saw a psychiatrist once at the age of 14 s/p OD. did not f/u. No past psych hospitalizations.   Reports having an episode of depression in 2019 s/p breakup followed by a period of high energy where she went to [place] impulsively and got more tattoos.   No current provider

## 2021-11-08 NOTE — ED PROVIDER NOTE - CARE PLAN
1 Principal Discharge DX:	Severe episode of recurrent major depressive disorder, with psychotic features

## 2021-11-08 NOTE — ED BEHAVIORAL HEALTH ASSESSMENT NOTE - REFERRED BY
[facility] - Division of Pulmonary, Critical Care and Sleep Medicine   Please call [phone] between 8am-pm weekdays, [phone] after hours and weekends    PATIENT WAS SEEN EARLIER TODAY BY ME    Interval Events: Still coughing and wheezing, but slightly better. Afebrile    REVIEW OF SYSTEMS:  CV: [- ] chest pain   Resp: [+ ] cough [- ] shortness of breath   [x ] All other systems negative  [ ] Unable to assess ROS because ________    OBJECTIVE:  ICU Vital Signs Last 24 Hrs  T(C): 36.8 (08 Dec 2019 16:14), Max: 37.1 (08 Dec 2019 11:11)  T(F): 98.2 (08 Dec 2019 16:14), Max: 98.8 (08 Dec 2019 11:11)  HR: 95 (08 Dec 2019 16:14) (79 - 95)  BP: 101/71 (08 Dec 2019 16:14) (101/71 - 152/92)  BP(mean): --  ABP: --  ABP(mean): --  RR: 19 (08 Dec 2019 16:14) (18 - 20)  SpO2: 92% (08 Dec 2019 16:14) (92% - 100%)        12-07 @ 07:01  -  12-08 @ 07:00  --------------------------------------------------------  IN: 300 mL / OUT: 0 mL / NET: 300 mL      CAPILLARY BLOOD GLUCOSE          PHYSICAL EXAM:  General: NAD  HEENT: NC/AT  Neck: supple  Respiratory:  CTA b/l, +end exp wheezes, no crackles or rhonchi  Cardiovascular:  RRR, no m/r/g  Abdomen: soft, NT/ND, +BS  Extremities: no clubbing, cyanosis or edema, warm  Skin: no rash  Neurological: AAOx3, non focal exam  Psychiatry: not anxious appearing, normal affect and mood    HOSPITAL MEDICATIONS:  MEDICATIONS  (STANDING):  albuterol/ipratropium for Nebulization 3 milliLiter(s) Nebulizer every 6 hours  benzonatate 100 milliGRAM(s) Oral every 8 hours  buDESOnide    Inhalation Suspension 0.5 milliGRAM(s) Inhalation two times a day  enoxaparin Injectable 40 milliGRAM(s) SubCutaneous daily  fluticasone propionate 50 MICROgram(s)/spray Nasal Spray 1 Spray(s) Both Nostrils two times a day  influenza   Vaccine 0.5 milliLiter(s) IntraMuscular once  methylPREDNISolone sodium succinate Injectable 40 milliGRAM(s) IV Push two times a day  montelukast 10 milliGRAM(s) Oral daily  pantoprazole    Tablet 40 milliGRAM(s) Oral before breakfast  sodium chloride 3%  Inhalation 5 milliLiter(s) Inhalation two times a day    MEDICATIONS  (PRN):  hydrocodone/homatropine Syrup 5 milliLiter(s) Oral at bedtime PRN Cough  ibuprofen  Tablet. 800 milliGRAM(s) Oral every 6 hours PRN Moderate Pain (4 - 6)      LABS:                        9.7    17.11 )-----------( 635      ( 08 Dec 2019 09:36 )             32.5     Hgb Trend: 9.7<--, 8.9<--, 8.1<--, 9.0<--  12-08    136  |  99  |  20  ----------------------------<  128<H>  3.7   |  24  |  0.66    Ca    8.1<L>      08 Dec 2019 07:09  Phos  3.9     12-08  Mg     2.2     12-08    TPro  7.4  /  Alb  4.3  /  TBili  0.3  /  DBili  x   /  AST  12  /  ALT  20  /  AlkPhos  59  12-08    Creatinine Trend: 0.66<--, 0.52<--, 0.63<--, 0.74<--            MICROBIOLOGY:   RVP - parainfluenza +    RADIOLOGY:  [ x] Reviewed and interpreted by me Self

## 2021-11-08 NOTE — ED BEHAVIORAL HEALTH ASSESSMENT NOTE - DESCRIPTION
calm, cooperative. Pleasant. no prns needed.   

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
## 2021-11-08 NOTE — ED ADULT NURSE NOTE - OBJECTIVE STATEMENT
Pt arrived to  reporting anxiety, chronic passive suicidal ideations, and an experience where she heard A/H. Pt reports visions of events before it happens. Pt cooperative at this time, denies S/I H/I A/H V/H. PT wanded for safety, changed into  clothing, personal property collected and logged.
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
## 2021-11-08 NOTE — ED PROVIDER NOTE - OBJECTIVE STATEMENT
26 year old female no past medical or psych history, presents for racing thoughts and ?AH. 26 year old female no past medical or psych history, presents for racing thoughts and ?AH.  Patient states that she has been having worsening intrusive and racing thoughts, to the point where while driving she thinks about driving her car off the road to commit suicide to make the voices stop.  Patient however denies any intent.  Patient also states that she has been hearing a ringing in her ears, and about 1 month ago had an episode while at work where she heard a voice, but the others next to her in her office state there was no voice.  Patient denies any recreational drug or etoh use today, states she used marijuana 2 days ago, drank alcohol last 2 weeks go.  No physical complaints or concerns.

## 2021-11-08 NOTE — ED BEHAVIORAL HEALTH NOTE - BEHAVIORAL HEALTH NOTE
COVID Exposure Screen- Patient  1.	*Have you had a COVID-19 test in the last 90 days?  (  x) Yes   (  ) No   (  ) Unknown- Reason: _____  IF YES PROCEED TO QUESTION #2. IF NO OR UNKNOWN, PLEASE SKIP TO QUESTION #3.  2.	Date of test(s) and result(s): __early October- Negative ______  3.	*Have you tested positive for COVID-19 antibodies? (  ) Yes   ( x ) No   (  ) Unknown- Reason: _____  IF YES PROCEED TO QUESTION #4. IF NO or UNKNOWN, PLEASE SKIP TO QUESTION #5.  4.	Date of positive antibody test: ________  5.	*Have you received 2 doses of the COVID-19 vaccine? (x  ) Yes   (  ) No   (  ) Unknown- Reason: _____   IF YES PROCEED TO QUESTION #6. IF NO or UNKNOWN, PLEASE SKIP TO QUESTION #7.  6.	Date of second dose: __august 2021 - MODERNA______  7.	*In the past 10 days, have you been around anyone with a positive COVID-19 test?* (  ) Yes   ( x ) No   (  ) Unknown- Reason: ____  IF YES PROCEED TO QUESTION #8. IF NO or UNKNOWN, PLEASE SKIP TO QUESTION #13.  8.	Were you within 6 feet of them for at least 15 minutes? (  ) Yes   (  ) No   (  ) Unknown- Reason: _____  9.	Have you provided care for them? (  ) Yes   (  ) No   (  ) Unknown- Reason: ______  10.	Have you had direct physical contact with them (touched, hugged, or kissed them)? (  ) Yes   (  ) No    (  ) Unknown- Reason: _____  11.	Have you shared eating or drinking utensils with them? (  ) Yes   (  ) No    (  ) Unknown- Reason: ____  12.	Have they sneezed, coughed, or somehow gotten respiratory droplets on you? (  ) Yes   (  ) No    (  ) Unknown- Reason: ______  13.	*Have you been out of [place] within the past 10 days?* (  ) Yes   (  x) No   (  ) Unknown- Reason: _____  IF YES PLEASE ANSWER THE FOLLOWING QUESTIONS:  14.	Which state/country have you been to? ______  15.	Were you there over 24 hours? (  ) Yes   (  ) No    (  ) Unknown- Reason: ______  16.	Date of return to [facility]: ______

## 2021-11-08 NOTE — ED BEHAVIORAL HEALTH ASSESSMENT NOTE - DIFFERENTIAL
MDD with psychotic features vs. bipolar depression vs. depression secondary to gen med condition  r/o BPD
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
## 2021-11-08 NOTE — ED ADULT TRIAGE NOTE - AS HEIGHT TYPE
Patient called stating the pelvic ultrasound was not ordered. Confirmed with Dr Butch Ram last visit note order for Pelvic ultrasound of left ovarian mass. Patient aware of order will call to make appointment.
stated

## 2021-11-08 NOTE — ED ADULT TRIAGE NOTE - CHIEF COMPLAINT QUOTE
Pt states she has been feeling depressed was seen at [facility] who referred pt to [facility], but her insurance wasn't accepted. Pt states she is still feeling depressed, Denies SI/HI denies AH, TH, VH. Denies ETOH or drug use. PMH: PCOS
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
## 2021-11-08 NOTE — ED ADULT NURSE NOTE - CHIEF COMPLAINT QUOTE
Pt states she has been feeling depressed was seen at [facility] who referred pt to [facility], but her insurance wasn't accepted. Pt states she is still feeling depressed, Denies SI/HI denies AH, TH, VH. Denies ETOH or drug use. PMH: PCOS

## 2021-11-08 NOTE — ED ADULT NURSE NOTE - NS ED NOTE  TALK SOMEONE YN
No This is a 58 y/o male with history as listed presenting for renal transplant. Patient underwent a DCD  donor right kidney transplant to right external iliac vessels on 17. The patient tolerated the procedure well. There were no complications. The patient was extubated in the OR.  The patient was transferred to the PACU in stable condition.     Pain was controlled. H/H stabilized. Cr down trending. [place] levels were monitored.  Post-op renal US revealed Right lower quadrant transplant kidney with mild hydronephrosis. The kidney appears well perfused although direct evaluation of the transplant renal artery is limited. The transplant renal vein is patent. CRISPIN drain removed prior to DC home. Landa removed and patient voiding. Diflucan started for donor fungal UTI. PPX ABX/antivirals and immunosuppressive medications started this hospitalization and RXs provided at discharge. Blood pressure was controlled. Evaluated by psych prior to DC home for PMH of bipolar disorder.     At the time of discharge, the patient was hemodynamically stable, was tolerating PO diet, was voiding urine, was ambulating, and was comfortable with adequate pain control. The patient was instructed to follow up with Dr. Chung within 1-2 weeks after discharge from the hospital. The patient felt comfortable with discharge. The patient was discharged to home. The patient had no other issues.

## 2021-11-08 NOTE — ED BEHAVIORAL HEALTH ASSESSMENT NOTE - NSBHSAALC_PSY_A_CORE FT
Detail Level: Detailed Quality 431: Preventive Care And Screening: Unhealthy Alcohol Use - Screening: Patient screened for unhealthy alcohol use using a single question and scores less than 2 times per year Quality 130: Documentation Of Current Medications In The Medical Record: Current Medications Documented Quality 226: Preventive Care And Screening: Tobacco Use: Screening And Cessation Intervention: Patient screened for tobacco and never smoked Quality 128: Preventive Care And Screening: Body Mass Index (Bmi) Screening And Follow-Up Plan: BMI is documented within normal parameters and no follow-up plan is required. Weekly use of at least 10 shots in a social environment
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
## 2021-11-08 NOTE — ED PROVIDER NOTE - CLINICAL SUMMARY MEDICAL DECISION MAKING FREE TEXT BOX
26 year old female no past medical or psych history, presents for racing thoughts and ?AH. Medical evaluation performed. There is no clinical evidence of intoxication or any acute medical problem requiring immediate intervention. Patient is awaiting psychiatric consultation. Final disposition will be determined by psychiatrist.

## 2021-11-09 LAB
A1C WITH ESTIMATED AVERAGE GLUCOSE RESULT: 5.3 % — SIGNIFICANT CHANGE UP (ref 4–5.6)
CHOLEST SERPL-MCNC: 165 MG/DL — SIGNIFICANT CHANGE UP
COVID-19 NUCLEOCAPSID GAM AB INTERP: POSITIVE
COVID-19 NUCLEOCAPSID TOTAL GAM ANTIBODY RESULT: 5.15 INDEX — HIGH
ESTIMATED AVERAGE GLUCOSE: 105 — SIGNIFICANT CHANGE UP
HDLC SERPL-MCNC: 49 MG/DL — LOW
LIPID PNL WITH DIRECT LDL SERPL: 101 MG/DL — HIGH
NON HDL CHOLESTEROL: 116 MG/DL — SIGNIFICANT CHANGE UP
SARS-COV-2 IGG+IGM SERPL QL IA: 5.15 INDEX — HIGH
SARS-COV-2 IGG+IGM SERPL QL IA: POSITIVE
TRIGL SERPL-MCNC: 77 MG/DL — SIGNIFICANT CHANGE UP

## 2021-11-09 PROCEDURE — 99222 1ST HOSP IP/OBS MODERATE 55: CPT

## 2021-11-09 RX ORDER — ACETAMINOPHEN 500 MG
650 TABLET ORAL EVERY 6 HOURS
Refills: 0 | Status: DISCONTINUED | OUTPATIENT
Start: 2021-11-09 | End: 2021-11-17

## 2021-11-09 RX ORDER — LANOLIN ALCOHOL/MO/W.PET/CERES
5 CREAM (GRAM) TOPICAL AT BEDTIME
Refills: 0 | Status: DISCONTINUED | OUTPATIENT
Start: 2021-11-09 | End: 2021-11-17

## 2021-11-09 RX ORDER — LAMOTRIGINE 25 MG/1
25 TABLET, ORALLY DISINTEGRATING ORAL DAILY
Refills: 0 | Status: DISCONTINUED | OUTPATIENT
Start: 2021-11-09 | End: 2021-11-17

## 2021-11-09 RX ORDER — MULTIVIT-MIN/FERROUS GLUCONATE 9 MG/15 ML
1 LIQUID (ML) ORAL DAILY
Refills: 0 | Status: DISCONTINUED | OUTPATIENT
Start: 2021-11-09 | End: 2021-11-17

## 2021-11-09 RX ADMIN — Medication 650 MILLIGRAM(S): at 20:57

## 2021-11-09 RX ADMIN — Medication 5 MILLIGRAM(S): at 20:56

## 2021-11-09 RX ADMIN — Medication 1 TABLET(S): at 15:18

## 2021-11-09 RX ADMIN — Medication 650 MILLIGRAM(S): at 11:33

## 2021-11-09 RX ADMIN — LAMOTRIGINE 25 MILLIGRAM(S): 25 TABLET, ORALLY DISINTEGRATING ORAL at 15:18

## 2021-11-09 NOTE — BH INPATIENT PSYCHIATRY ASSESSMENT NOTE - NSBHCHARTREVIEWVS_PSY_A_CORE FT
Vital Signs Last 24 Hrs  T(C): 36.7 (11-09-21 @ 07:25), Max: 36.7 (11-09-21 @ 07:25)  T(F): 98 (11-09-21 @ 07:25), Max: 98 (11-09-21 @ 07:25)  HR: 87 (11-08-21 @ 12:58) (87 - 87)  BP: 120/68 (11-08-21 @ 12:58) (120/68 - 120/68)  BP(mean): --  RR: 16 (11-09-21 @ 07:25) (16 - 16)  SpO2: 100% (11-08-21 @ 12:58) (100% - 100%)    Orthostatic VS  11-09-21 @ 07:25  Lying BP: --/-- HR: --  Sitting BP: 108/70 HR: 89  Standing BP: 111/79 HR: 100  Site: --  Mode: --  Orthostatic VS  11-08-21 @ 18:40  Lying BP: --/-- HR: --  Sitting BP: 105/68 HR: 89  Standing BP: 100/71 HR: 94  Site: upper left arm  Mode: electronic   Vital Signs Last 24 Hrs  T(C): 36.7 (11-09-21 @ 07:25), Max: 36.7 (11-09-21 @ 07:25)  T(F): 98 (11-09-21 @ 07:25), Max: 98 (11-09-21 @ 07:25)  HR: --  BP: --  BP(mean): --  RR: 16 (11-09-21 @ 07:25) (16 - 16)  SpO2: --    Orthostatic VS  11-09-21 @ 07:25  Lying BP: --/-- HR: --  Sitting BP: 108/70 HR: 89  Standing BP: 111/79 HR: 100  Site: --  Mode: --  Orthostatic VS  11-08-21 @ 18:40  Lying BP: --/-- HR: --  Sitting BP: 105/68 HR: 89  Standing BP: 100/71 HR: 94  Site: upper left arm  Mode: electronic

## 2021-11-09 NOTE — BH INPATIENT PSYCHIATRY ASSESSMENT NOTE - NSBHCHARTREVIEWINVESTIGATE_PSY_A_CORE FT
EKG: completed in ED 11/8/21, wnl    < from: CT Head No Cont (11.08.21 @ 14:49) >    IMPRESSION:    -No acute intracranial findings.    < end of copied text >

## 2021-11-09 NOTE — BH PATIENT PROFILE - HOME MEDICATIONS
Keflex 500 mg oral capsule , 1 cap(s) orally 3 times a day (with meals)    mg oral tablet , 1 tab(s) orally every 8 hours   ibuprofen 600 mg oral tablet , 1 tab(s) orally every 6 hours

## 2021-11-09 NOTE — BH INPATIENT PSYCHIATRY ASSESSMENT NOTE - NSBHSATHC_PSY_A_CORE FT
Daily use in March 2020 throughout quarentine pandemic, quit at the end of 2020 Daily use in March 2020 throughout quarantine pandemic, quit at the end of 2020
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
## 2021-11-09 NOTE — BH INPATIENT PSYCHIATRY ASSESSMENT NOTE - NSCOMMENTSUICRISKFACT_PSY_ALL_CORE
Longstanding history of depressive symptoms, self-injurious behavior and SA places this pt at high risk of suicide.

## 2021-11-09 NOTE — BH INPATIENT PSYCHIATRY ASSESSMENT NOTE - DESCRIPTION
lives with mom and brother in [place], will be enrolled at [employer] in 01/22 for international business, single

## 2021-11-09 NOTE — BH INPATIENT PSYCHIATRY ASSESSMENT NOTE - NSPRESENTSXS_PSY_ALL_CORE
Depressed mood/Anhedonia/Psychosis/Impulsivity/Hopelessness or despair/Severe anxiety, agitation or panic

## 2021-11-09 NOTE — PSYCHIATRIC REHAB INITIAL EVALUATION - NSBHEDUCURSCHOOL_PSY_ALL_CORE
Patient reported January 2022 enrollment at [employer] to complete her bachelor's degree in International Business Administration./No

## 2021-11-09 NOTE — BH SOCIAL WORK INITIAL PSYCHOSOCIAL EVALUATION - NSBHSATHC_PSY_A_CORE FT
Pt reports she first used marijuana age 16. Daily use in March 2020 throughout quarantine pandemic, quit at the end of 2020  Pt reports she currently uses marijuana "once in a blue moon" or when friends provide. Last use was Sat before admission.
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
## 2021-11-09 NOTE — BH SOCIAL WORK INITIAL PSYCHOSOCIAL EVALUATION - NSBHSAALC_PSY_A_CORE FT
Weekly use of at least 10 shots in a social environment  Pt reports she drinks once a week/socially and "drinks to get drunk" Last use was 2 weeks ago.

## 2021-11-09 NOTE — BH INPATIENT PSYCHIATRY ASSESSMENT NOTE - NSBHMETABOLIC_PSY_ALL_CORE_FT
BMI: BMI (kg/m2): 28.5 (11-08-21 @ 18:40)  HbA1c: A1C with Estimated Average Glucose Result: 5.3 % (11-09-21 @ 10:39)    Glucose: Glucose, Serum: 88 mg/dL (11.08.21 @ 14:25)   BP: 120/68 (11-08-21 @ 12:58) (120/68 - 120/68)  Lipid Panel: Date/Time: 11-09-21 @ 10:39  Cholesterol, Serum: 165  Direct LDL: --  HDL Cholesterol, Serum: 49  Total Cholesterol/HDL Ration Measurement: --  Triglycerides, Serum: 77   BMI: BMI (kg/m2): 28.5 (11-08-21 @ 18:40)  HbA1c: A1C with Estimated Average Glucose Result: 5.3 % (11-09-21 @ 10:39)    Glucose:   BP: 120/68 (11-08-21 @ 12:58) (120/68 - 120/68)  Lipid Panel: Date/Time: 11-09-21 @ 10:39  Cholesterol, Serum: 165  Direct LDL: --  HDL Cholesterol, Serum: 49  Total Cholesterol/HDL Ration Measurement: --  Triglycerides, Serum: 77
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
## 2021-11-09 NOTE — PSYCHIATRIC REHAB INITIAL EVALUATION - NSBHPRRECOMMEND_PSY_ALL_CORE
Writer met with patient to orient her to psychiatric rehabilitation staff and services. Throughout the session, the patient was pleasant, engaged, and forthcoming with personal history. Patient identified her reason for admission as a way to take precaution in managing her anxiety and avoiding bad habits (i.e. cutting behaviors). Patient denied SI, HI, and VH. Patient endorsed AH in the context of a voice calling her name. Patient reported past history of insomnia, sleep paralysis, self-injurious behavior (i.e. cutting in high school). Patient reported worsening anxiety in the context of severe panic attacks that cause physical pain. Per chart, patient presents with worsening anxiety, depression, AH, SI (i.e. vivid images depicting her death) and increased alcohol use. Psychiatric rehabilitation staff and patient established a collaborative treatment goal for the patient to work on over the next 7 days. Patient expressed interest in outpatient therapy to support her recovery at discharge. Psychiatric rehabilitation staff will provide encouragement, support, psychotherapy, and psychoeducation to assist the patient in the progression of her treatment goal.
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
## 2021-11-09 NOTE — BH INPATIENT PSYCHIATRY ASSESSMENT NOTE - CURRENT MEDICATION
MEDICATIONS  (STANDING):    MEDICATIONS  (PRN):  acetaminophen     Tablet .. 650 milliGRAM(s) Oral every 6 hours PRN Mild Pain (1 - 3), Moderate Pain (4 - 6)  hydrOXYzine hydrochloride 50 milliGRAM(s) Oral every 6 hours PRN anxiety  LORazepam     Tablet 1 milliGRAM(s) Oral every 6 hours PRN agitation  LORazepam   Injectable 1 milliGRAM(s) IntraMuscular once PRN severe agitation  melatonin. 3 milliGRAM(s) Oral at bedtime PRN Insomnia   MEDICATIONS  (STANDING):  lamoTRIgine 25 milliGRAM(s) Oral daily  multivitamin/minerals 1 Tablet(s) Oral daily    MEDICATIONS  (PRN):  acetaminophen     Tablet .. 650 milliGRAM(s) Oral every 6 hours PRN Mild Pain (1 - 3), Moderate Pain (4 - 6)  aluminum hydroxide/magnesium hydroxide/simethicone Suspension 30 milliLiter(s) Oral every 6 hours PRN Dyspepsia  hydrOXYzine hydrochloride 50 milliGRAM(s) Oral every 6 hours PRN anxiety  LORazepam     Tablet 1 milliGRAM(s) Oral every 6 hours PRN agitation  LORazepam   Injectable 1 milliGRAM(s) IntraMuscular once PRN severe agitation  melatonin. 5 milliGRAM(s) Oral at bedtime PRN Insomnia
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
## 2021-11-09 NOTE — PSYCHIATRIC REHAB INITIAL EVALUATION - NSBHALCSUBCHOICE_PSY_ALL_CORE
Patient reported socially drinking alcohol with her friends on the weekends. Patient reported taking up to 10 shots of liquor in one sitting.

## 2021-11-09 NOTE — BH INPATIENT PSYCHIATRY ASSESSMENT NOTE - NSSUICPROTFACT_PSY_ALL_CORE
Identifies reasons for living/Fear of death or the actual act of killing self/Engaged in work or school

## 2021-11-09 NOTE — BH INPATIENT PSYCHIATRY ASSESSMENT NOTE - NSICDXBHTERTIARYDX_PSY_ALL_CORE
R/O Borderline personality disorder   F60.3  R/O MDD (major depressive disorder), recurrent, severe, with psychosis   F33.3

## 2021-11-10 RX ORDER — POLYETHYLENE GLYCOL 3350 17 G/17G
17 POWDER, FOR SOLUTION ORAL ONCE
Refills: 0 | Status: COMPLETED | OUTPATIENT
Start: 2021-11-10 | End: 2021-11-11

## 2021-11-10 RX ORDER — SENNA PLUS 8.6 MG/1
2 TABLET ORAL AT BEDTIME
Refills: 0 | Status: DISCONTINUED | OUTPATIENT
Start: 2021-11-10 | End: 2021-11-17

## 2021-11-10 RX ADMIN — LAMOTRIGINE 25 MILLIGRAM(S): 25 TABLET, ORALLY DISINTEGRATING ORAL at 09:16

## 2021-11-10 RX ADMIN — SENNA PLUS 2 TABLET(S): 8.6 TABLET ORAL at 20:24

## 2021-11-10 RX ADMIN — Medication 1 TABLET(S): at 09:15

## 2021-11-10 RX ADMIN — Medication 5 MILLIGRAM(S): at 22:01

## 2021-11-10 NOTE — BH INPATIENT PSYCHIATRY PROGRESS NOTE - PRN MEDS
MEDICATIONS  (PRN):  acetaminophen     Tablet .. 650 milliGRAM(s) Oral every 6 hours PRN Mild Pain (1 - 3), Moderate Pain (4 - 6)  aluminum hydroxide/magnesium hydroxide/simethicone Suspension 30 milliLiter(s) Oral every 6 hours PRN Dyspepsia  hydrOXYzine hydrochloride 50 milliGRAM(s) Oral every 6 hours PRN anxiety  LORazepam     Tablet 1 milliGRAM(s) Oral every 6 hours PRN agitation  LORazepam   Injectable 1 milliGRAM(s) IntraMuscular once PRN severe agitation  melatonin. 5 milliGRAM(s) Oral at bedtime PRN Insomnia
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
## 2021-11-10 NOTE — BH INPATIENT PSYCHIATRY PROGRESS NOTE - NSBHMETABOLIC_PSY_ALL_CORE_FT
BMI: BMI (kg/m2): 28.5 (11-08-21 @ 18:40)  HbA1c: A1C with Estimated Average Glucose Result: 5.3 % (11-09-21 @ 10:39)    Glucose: Glucose, Serum: 88 mg/dL (11.08.21 @ 14:25)   BP: 120/68 (11-08-21 @ 12:58) (120/68 - 120/68)  Lipid Panel: Date/Time: 11-09-21 @ 10:39  Cholesterol, Serum: 165  Direct LDL: --  HDL Cholesterol, Serum: 49  Total Cholesterol/HDL Ration Measurement: --  Triglycerides, Serum: 77   BMI: BMI (kg/m2): 28.5 (11-08-21 @ 18:40)  HbA1c: A1C with Estimated Average Glucose Result: 5.3 % (11-09-21 @ 10:39)    Glucose:   BP: --  Lipid Panel: Date/Time: 11-09-21 @ 10:39  Cholesterol, Serum: 165  Direct LDL: --  HDL Cholesterol, Serum: 49  Total Cholesterol/HDL Ration Measurement: --  Triglycerides, Serum: 77

## 2021-11-10 NOTE — BH INPATIENT PSYCHIATRY PROGRESS NOTE - NSBHFUPINTERVALHXFT_PSY_A_CORE
Pt is being seen for panic attacks, depression and passive SI. Care was discussed with the interdisciplinary care team.     Pt received second dose of lamotrigene this morning. She denies any SE, including rash. Says she feels the same with her mood, though when asked how she is feeling today, she say "comfortable". Pt endorses continued neck and shoulder pain. Shoulder pain is a chronic MSK issue. Neck pain started post-viral infection and notes this pain seems to be extending to cause her headaches. denies any bulges or lumps in the neck, states the left side of the neck is mildly tender to palpation. Pt complains of constipation with last BM on 11/8. Offered MiraLax and will continue to monitor.    Today the patient denies SI, HI, and any urges for self-injury. She says she has not had any AH, VH or paranoia since being here. She feels safe here on the floor. She had interrupted sleep last night, awaking from vivid dreams but denies any nightmares or panic attacks. Her mother visited yesterday and the encounter went well.

## 2021-11-10 NOTE — BH INPATIENT PSYCHIATRY PROGRESS NOTE - NSBHCHARTREVIEWVS_PSY_A_CORE FT
Vital Signs Last 24 Hrs  T(C): 36.6 (11-10-21 @ 07:49), Max: 36.6 (11-10-21 @ 07:49)  T(F): 97.9 (11-10-21 @ 07:49), Max: 97.9 (11-10-21 @ 07:49)  HR: --  BP: --  BP(mean): --  RR: 18 (11-10-21 @ 07:49) (18 - 18)  SpO2: --    Orthostatic VS  11-10-21 @ 07:49  Lying BP: --/-- HR: --  Sitting BP: 105/69 HR: 90  Standing BP: 112/66 HR: 78  Site: --  Mode: --  Orthostatic VS  11-09-21 @ 07:25  Lying BP: --/-- HR: --  Sitting BP: 108/70 HR: 89  Standing BP: 111/79 HR: 100  Site: --  Mode: --  Orthostatic VS  11-08-21 @ 18:40  Lying BP: --/-- HR: --  Sitting BP: 105/68 HR: 89  Standing BP: 100/71 HR: 94  Site: upper left arm  Mode: electronic   Vital Signs Last 24 Hrs  T(C): 36.9 (11-11-21 @ 17:55), Max: 36.9 (11-11-21 @ 17:55)  T(F): 98.4 (11-11-21 @ 17:55), Max: 98.4 (11-11-21 @ 17:55)  HR: --  BP: --  BP(mean): --  RR: --  SpO2: --    Orthostatic VS  11-11-21 @ 07:41  Lying BP: --/-- HR: --  Sitting BP: 103/68 HR: 82  Standing BP: 103/66 HR: 95  Site: upper left arm  Mode: electronic  Orthostatic VS  11-10-21 @ 07:49  Lying BP: --/-- HR: --  Sitting BP: 105/69 HR: 90  Standing BP: 112/66 HR: 78  Site: --  Mode: --

## 2021-11-10 NOTE — BH INPATIENT PSYCHIATRY PROGRESS NOTE - CURRENT MEDICATION
MEDICATIONS  (STANDING):  lamoTRIgine 25 milliGRAM(s) Oral daily  multivitamin/minerals 1 Tablet(s) Oral daily  polyethylene glycol 3350 17 Gram(s) Oral once  senna 2 Tablet(s) Oral at bedtime    MEDICATIONS  (PRN):  acetaminophen     Tablet .. 650 milliGRAM(s) Oral every 6 hours PRN Mild Pain (1 - 3), Moderate Pain (4 - 6)  aluminum hydroxide/magnesium hydroxide/simethicone Suspension 30 milliLiter(s) Oral every 6 hours PRN Dyspepsia  hydrOXYzine hydrochloride 50 milliGRAM(s) Oral every 6 hours PRN anxiety  LORazepam     Tablet 1 milliGRAM(s) Oral every 6 hours PRN agitation  LORazepam   Injectable 1 milliGRAM(s) IntraMuscular once PRN severe agitation  melatonin. 5 milliGRAM(s) Oral at bedtime PRN Insomnia   MEDICATIONS  (STANDING):  lamoTRIgine 25 milliGRAM(s) Oral daily  multivitamin/minerals 1 Tablet(s) Oral daily  senna 2 Tablet(s) Oral at bedtime    MEDICATIONS  (PRN):  acetaminophen     Tablet .. 650 milliGRAM(s) Oral every 6 hours PRN Mild Pain (1 - 3), Moderate Pain (4 - 6)  aluminum hydroxide/magnesium hydroxide/simethicone Suspension 30 milliLiter(s) Oral every 6 hours PRN Dyspepsia  hydrOXYzine hydrochloride 50 milliGRAM(s) Oral every 6 hours PRN anxiety  LORazepam     Tablet 1 milliGRAM(s) Oral every 6 hours PRN agitation  LORazepam   Injectable 1 milliGRAM(s) IntraMuscular once PRN severe agitation  melatonin. 5 milliGRAM(s) Oral at bedtime PRN Insomnia

## 2021-11-11 RX ADMIN — POLYETHYLENE GLYCOL 3350 17 GRAM(S): 17 POWDER, FOR SOLUTION ORAL at 13:32

## 2021-11-11 RX ADMIN — LAMOTRIGINE 25 MILLIGRAM(S): 25 TABLET, ORALLY DISINTEGRATING ORAL at 08:44

## 2021-11-11 RX ADMIN — SENNA PLUS 2 TABLET(S): 8.6 TABLET ORAL at 20:43

## 2021-11-11 RX ADMIN — Medication 1 TABLET(S): at 08:45

## 2021-11-11 RX ADMIN — Medication 5 MILLIGRAM(S): at 20:44

## 2021-11-11 NOTE — BH INPATIENT PSYCHIATRY PROGRESS NOTE - NSBHMETABOLIC_PSY_ALL_CORE_FT
BMI: BMI (kg/m2): 28.5 (11-08-21 @ 18:40)  HbA1c: A1C with Estimated Average Glucose Result: 5.3 % (11-09-21 @ 10:39)    Glucose:   BP: --  Lipid Panel: Date/Time: 11-09-21 @ 10:39  Cholesterol, Serum: 165  Direct LDL: --  HDL Cholesterol, Serum: 49  Total Cholesterol/HDL Ration Measurement: --  Triglycerides, Serum: 77

## 2021-11-11 NOTE — BH INPATIENT PSYCHIATRY PROGRESS NOTE - NSBHCHARTREVIEWVS_PSY_A_CORE FT
Vital Signs Last 24 Hrs  T(C): 36.8 (11-11-21 @ 07:41), Max: 36.8 (11-11-21 @ 07:41)  T(F): 98.2 (11-11-21 @ 07:41), Max: 98.2 (11-11-21 @ 07:41)  HR: --  BP: --  BP(mean): --  RR: --  SpO2: --    Orthostatic VS  11-11-21 @ 07:41  Lying BP: --/-- HR: --  Sitting BP: 103/68 HR: 82  Standing BP: 103/66 HR: 95  Site: upper left arm  Mode: electronic  Orthostatic VS  11-10-21 @ 07:49  Lying BP: --/-- HR: --  Sitting BP: 105/69 HR: 90  Standing BP: 112/66 HR: 78  Site: --  Mode: --   Vital Signs Last 24 Hrs  T(C): 36.9 (11-11-21 @ 17:55), Max: 36.9 (11-11-21 @ 17:55)  T(F): 98.4 (11-11-21 @ 17:55), Max: 98.4 (11-11-21 @ 17:55)  HR: --  BP: --  BP(mean): --  RR: --  SpO2: --    Orthostatic VS  11-11-21 @ 07:41  Lying BP: --/-- HR: --  Sitting BP: 103/68 HR: 82  Standing BP: 103/66 HR: 95  Site: upper left arm  Mode: electronic  Orthostatic VS  11-10-21 @ 07:49  Lying BP: --/-- HR: --  Sitting BP: 105/69 HR: 90  Standing BP: 112/66 HR: 78  Site: --  Mode: --
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
## 2021-11-11 NOTE — BH INPATIENT PSYCHIATRY PROGRESS NOTE - NSBHFUPINTERVALHXFT_PSY_A_CORE
Pt is being seen for panic attacks, depression and passive SI. Care was discussed with the interdisciplinary care team.     Pt continues to receive lamotrigene. She denies any SE, no rash. Constipation with last BM on 11/8, persists despite MiraLax and Senna yesterday. Will continue this regiment today and monitor. Pt is aware to make nursing aware if she is able to have a bowel movement     Today the patient denies SI, HI, and any urges for self-injury. She says she has not had any AH, VH or paranoia since being here. She feels safe here on the floor. Says that she feels much better since being here, and does not know if it is the medication, therapy or a combination of all of these. She says she got to bed alte because she had a "burst of energy" that lasted 3-4 hours where she completed a puzzle. She then had interrupted sleep, awaking from vivid dreams but denies any nightmares, panic attacks or palpitations. Enjoyed DBT therapy yesterday, saying that it helped her realize that she has never been good at receiving compliments or affection which she wants to work on. She has been speaking with her mother on the phone, and became slightly frustrated yesterday after sharing her insight into her resistance to affection/compliments as it was not fully received by her mom.

## 2021-11-11 NOTE — BH INPATIENT PSYCHIATRY PROGRESS NOTE - CURRENT MEDICATION
MEDICATIONS  (STANDING):  lamoTRIgine 25 milliGRAM(s) Oral daily  multivitamin/minerals 1 Tablet(s) Oral daily  senna 2 Tablet(s) Oral at bedtime    MEDICATIONS  (PRN):  acetaminophen     Tablet .. 650 milliGRAM(s) Oral every 6 hours PRN Mild Pain (1 - 3), Moderate Pain (4 - 6)  aluminum hydroxide/magnesium hydroxide/simethicone Suspension 30 milliLiter(s) Oral every 6 hours PRN Dyspepsia  hydrOXYzine hydrochloride 50 milliGRAM(s) Oral every 6 hours PRN anxiety  LORazepam     Tablet 1 milliGRAM(s) Oral every 6 hours PRN agitation  LORazepam   Injectable 1 milliGRAM(s) IntraMuscular once PRN severe agitation  melatonin. 5 milliGRAM(s) Oral at bedtime PRN Insomnia

## 2021-11-11 NOTE — BH TREATMENT PLAN - NSTXANXINTERPR_PSY_ALL_CORE
Over the next 7 days, Psychiatric Rehabilitation staff will utilize group and individual psychotherapy, and psychoeducation to assist the patient in reaching her treatment goal.

## 2021-11-11 NOTE — BH TREATMENT PLAN - NSTXDCOPLKINTERSW_PSY_ALL_CORE
SW will meet with pt to provide psychoed and support towards pt's goal. SW will identify and create appropriate referrals for follow-up care. SW meet with multidisciplinary team daily for updates on pt's goal progress.

## 2021-11-11 NOTE — BH INPATIENT PSYCHIATRY PROGRESS NOTE - NSICDXBHSECONDARYDX_PSY_ALL_CORE
Severe episode of recurrent major depressive disorder, with psychotic features   F33.3  
Severe episode of recurrent major depressive disorder, with psychotic features   F33.3

## 2021-11-12 PROCEDURE — 90853 GROUP PSYCHOTHERAPY: CPT

## 2021-11-12 RX ADMIN — Medication 650 MILLIGRAM(S): at 18:11

## 2021-11-12 RX ADMIN — Medication 650 MILLIGRAM(S): at 15:00

## 2021-11-12 RX ADMIN — SENNA PLUS 2 TABLET(S): 8.6 TABLET ORAL at 21:22

## 2021-11-12 RX ADMIN — Medication 5 MILLIGRAM(S): at 21:22

## 2021-11-12 RX ADMIN — Medication 1 TABLET(S): at 08:59

## 2021-11-12 RX ADMIN — LAMOTRIGINE 25 MILLIGRAM(S): 25 TABLET, ORALLY DISINTEGRATING ORAL at 08:59

## 2021-11-12 NOTE — BH INPATIENT PSYCHIATRY PROGRESS NOTE - NSBHCHARTREVIEWVS_PSY_A_CORE FT
Vital Signs Last 24 Hrs  T(C): 36.8 (11-12-21 @ 07:41), Max: 36.9 (11-11-21 @ 17:55)  T(F): 98.3 (11-12-21 @ 07:41), Max: 98.4 (11-11-21 @ 17:55)  HR: --  BP: --  BP(mean): --  RR: 17 (11-12-21 @ 07:41) (17 - 17)  SpO2: --    Orthostatic VS  11-12-21 @ 07:41  Lying BP: --/-- HR: --  Sitting BP: 102/67 HR: 80  Standing BP: 110/79 HR: 95  Site: --  Mode: --  Orthostatic VS  11-11-21 @ 07:41  Lying BP: --/-- HR: --  Sitting BP: 103/68 HR: 82  Standing BP: 103/66 HR: 95  Site: upper left arm  Mode: electronic   Vital Signs Last 24 Hrs  T(C): 36.7 (11-12-21 @ 17:20), Max: 36.8 (11-12-21 @ 07:41)  T(F): 98 (11-12-21 @ 17:20), Max: 98.3 (11-12-21 @ 07:41)  HR: --  BP: --  BP(mean): --  RR: 17 (11-12-21 @ 07:41) (17 - 17)  SpO2: --    Orthostatic VS  11-12-21 @ 07:41  Lying BP: --/-- HR: --  Sitting BP: 102/67 HR: 80  Standing BP: 110/79 HR: 95  Site: --  Mode: --  Orthostatic VS  11-11-21 @ 07:41  Lying BP: --/-- HR: --  Sitting BP: 103/68 HR: 82  Standing BP: 103/66 HR: 95  Site: upper left arm  Mode: electronic

## 2021-11-12 NOTE — BH INPATIENT PSYCHIATRY PROGRESS NOTE - NSBHFUPINTERVALHXFT_PSY_A_CORE
Pt is being seen for panic attacks, depression and passive SI. Care was discussed with the interdisciplinary care team.     Pt continues to receive lamotrigene. She denies any SE, no rash. She had a BM yesterday, though prefers to continue with senna since she has a history of constipation. Pt states she had short periods of feeling lonely yesterday since some of the pts on the floor were discharged. She notes using her book and journal as coping mechanisms for this loneliness. Denies any SI or self-injurious thoughts. States she overall still feels "less sad" than before she came, but she finds it difficult to assess. She does endorse moments of intrusive thoughts about how her mom would feel if she (the pt) was no longer here. But states that she has an "active imagination" and continuously daydreams about many scenarios making these feel insignificant to her. She states before admission, the intrusive thoughts she was having were much stronger, lasted longer and would preoccupy her, not similar all all to the momentary thoughts she had yesterday.    She says she has not had any AH, VH or paranoia since being here.  She says she had difficulty sleeping last note though she tried and could not maintain sleep. She reports this is usual for her, only getting around 5 hours per night. She plans to not take melatonin over the weekend to see if this improves sleep quality. Enjoyed group therapy yesterday. Her mother is becoming more open to helping the pt, asking how she can be more mindful and helpful at home to make things easier for her. Pt said this conversation went well. Pt is being seen for panic attacks, depression and passive SI. Care was discussed with the interdisciplinary care team.     Pt continues to receive lamotrigine. She denies any SE, no rash. She had a BM yesterday, though prefers to continue with senna since she has a history of constipation. Pt states she had short periods of feeling lonely yesterday since some of the pts on the floor were discharged. She notes using her book and journal as coping mechanisms for this loneliness. Denies any SI or self-injurious thoughts. States she overall still feels "less sad" than before she came, but she finds it difficult to assess. She does endorse moments of intrusive thoughts about how her mom would feel if she (the pt) was no longer here. But states that she has an "active imagination" and continuously daydreams about many scenarios making these feel insignificant to her. She states before admission, the intrusive thoughts she was having were much stronger, lasted longer and would preoccupy her, not similar all all to the momentary thoughts she had yesterday.    She says she has not had any AH, VH or paranoia since being here.  She says she had difficulty sleeping last note though she tried and could not maintain sleep. She reports this is usual for her, only getting around 5 hours per night. She plans to not take melatonin over the weekend to see if this improves sleep quality. Enjoyed group therapy yesterday. Her mother is becoming more open to helping the pt, asking how she can be more mindful and helpful at home to make things easier for her. Pt said this conversation went well.
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
## 2021-11-13 PROCEDURE — 99231 SBSQ HOSP IP/OBS SF/LOW 25: CPT

## 2021-11-13 RX ORDER — IBUPROFEN 200 MG
600 TABLET ORAL EVERY 6 HOURS
Refills: 0 | Status: DISCONTINUED | OUTPATIENT
Start: 2021-11-13 | End: 2021-11-17

## 2021-11-13 RX ADMIN — Medication 1 TABLET(S): at 08:37

## 2021-11-13 RX ADMIN — LAMOTRIGINE 25 MILLIGRAM(S): 25 TABLET, ORALLY DISINTEGRATING ORAL at 08:37

## 2021-11-13 NOTE — BH INPATIENT PSYCHIATRY PROGRESS NOTE - NSBHMETABOLIC_PSY_ALL_CORE_FT
Patient informed of WNL results.   MM   BMI: BMI (kg/m2): 28.5 (11-08-21 @ 18:40)  HbA1c: A1C with Estimated Average Glucose Result: 5.3 % (11-09-21 @ 10:39)    Glucose:   BP: --  Lipid Panel: Date/Time: 11-09-21 @ 10:39  Cholesterol, Serum: 165  Direct LDL: --  HDL Cholesterol, Serum: 49  Total Cholesterol/HDL Ration Measurement: --  Triglycerides, Serum: 77

## 2021-11-13 NOTE — BH INPATIENT PSYCHIATRY PROGRESS NOTE - CURRENT MEDICATION
MEDICATIONS  (STANDING):  lamoTRIgine 25 milliGRAM(s) Oral daily  multivitamin/minerals 1 Tablet(s) Oral daily  senna 2 Tablet(s) Oral at bedtime    MEDICATIONS  (PRN):  acetaminophen     Tablet .. 650 milliGRAM(s) Oral every 6 hours PRN Mild Pain (1 - 3), Moderate Pain (4 - 6)  aluminum hydroxide/magnesium hydroxide/simethicone Suspension 30 milliLiter(s) Oral every 6 hours PRN Dyspepsia  hydrOXYzine hydrochloride 50 milliGRAM(s) Oral every 6 hours PRN anxiety  ibuprofen  Tablet. 600 milliGRAM(s) Oral every 6 hours PRN Moderate Pain (4 - 6), Severe Pain (7 - 10)  LORazepam     Tablet 1 milliGRAM(s) Oral every 6 hours PRN agitation  LORazepam   Injectable 1 milliGRAM(s) IntraMuscular once PRN severe agitation  melatonin. 5 milliGRAM(s) Oral at bedtime PRN Insomnia
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
## 2021-11-13 NOTE — BH INPATIENT PSYCHIATRY PROGRESS NOTE - PRN MEDS
MEDICATIONS  (PRN):  acetaminophen     Tablet .. 650 milliGRAM(s) Oral every 6 hours PRN Mild Pain (1 - 3), Moderate Pain (4 - 6)  aluminum hydroxide/magnesium hydroxide/simethicone Suspension 30 milliLiter(s) Oral every 6 hours PRN Dyspepsia  hydrOXYzine hydrochloride 50 milliGRAM(s) Oral every 6 hours PRN anxiety  ibuprofen  Tablet. 600 milliGRAM(s) Oral every 6 hours PRN Moderate Pain (4 - 6), Severe Pain (7 - 10)  LORazepam     Tablet 1 milliGRAM(s) Oral every 6 hours PRN agitation  LORazepam   Injectable 1 milliGRAM(s) IntraMuscular once PRN severe agitation  melatonin. 5 milliGRAM(s) Oral at bedtime PRN Insomnia

## 2021-11-13 NOTE — BH INPATIENT PSYCHIATRY PROGRESS NOTE - NSBHCHARTREVIEWVS_PSY_A_CORE FT
Vital Signs Last 24 Hrs  T(C): 36.4 (11-13-21 @ 17:12), Max: 36.4 (11-13-21 @ 17:12)  T(F): 97.6 (11-13-21 @ 17:12), Max: 97.6 (11-13-21 @ 17:12)  HR: --  BP: --  BP(mean): --  RR: --  SpO2: --    Orthostatic VS  11-12-21 @ 07:41  Lying BP: --/-- HR: --  Sitting BP: 102/67 HR: 80  Standing BP: 110/79 HR: 95  Site: --  Mode: --

## 2021-11-13 NOTE — BH INPATIENT PSYCHIATRY PROGRESS NOTE - NSBHFUPINTERVALHXFT_PSY_A_CORE
Pt is being seen for panic attacks, depression and passive SI. Care was discussed with nursing team.   As per nursing, pt has been bizarre, oddly related but adherent to meds.   On interview, pt is preoccupied with a migraine headache she says that she's had for the last few days. Pt says that hot packs have  helped a little but not entirely.  Pt says that she couldn't sleep last night because of the headache. She denies any AH, paranoid thoughts, SI, problems with the meds.

## 2021-11-14 RX ADMIN — Medication 1 TABLET(S): at 09:26

## 2021-11-14 RX ADMIN — LAMOTRIGINE 25 MILLIGRAM(S): 25 TABLET, ORALLY DISINTEGRATING ORAL at 09:26

## 2021-11-15 PROCEDURE — 90832 PSYTX W PT 30 MINUTES: CPT

## 2021-11-15 PROCEDURE — 99232 SBSQ HOSP IP/OBS MODERATE 35: CPT

## 2021-11-15 RX ADMIN — LAMOTRIGINE 25 MILLIGRAM(S): 25 TABLET, ORALLY DISINTEGRATING ORAL at 09:09

## 2021-11-15 RX ADMIN — Medication 1 TABLET(S): at 09:09

## 2021-11-15 RX ADMIN — Medication 650 MILLIGRAM(S): at 09:39

## 2021-11-15 NOTE — BH PSYCHOLOGY - GROUP THERAPY NOTE - NSPSYCHOLGRPDBTINTR_PSY_A_CORE FT
Patient attended the DBT Skills Acquisition group today, which takes place daily and is invite only. Group began with introductions, skills check in, and a mindfulness exercise. Group members shared their feedback about the mindfulness exercise. Today's group focused on the Interpersonal Effectiveness module and reviewed the importance of receiving and providing validation in interpersonal interactions as well as the helpful and painful consequences of invalidation, as well as ways to recover from invalidation. Group members identified validating statements that they have said toward themselves and to others and discussed reasons the statements were more effective than invalidating statements. Group members took turns reading aloud from the group handouts and engaged in discussion about experiences with validation and invalidation.  encouraged group members to practice the skill outside of session. 
NA
n/a

## 2021-11-15 NOTE — BH PSYCHOLOGY - GROUP THERAPY NOTE - NSPSYCHOLGRPDBTTOL_PSY_A_CORE FT
NA
n/a
Today's skill focused on the distress tolerance module: radical acceptance. The group began with introductions, skills check in, and a mindfulness exercise. Next, the  introduced the module and discussed the skills involved. Then, some members of the group took turns reading out of the handout. After each part of the handout, the group engaged in a discussion about what they thought about the skill discussed, whether they've tried applying it and/or any difficulties with applying such a skill. The group ended by reviewing the practice worksheet and encouraging group members to practice the skill outside of group.

## 2021-11-15 NOTE — BH PSYCHOLOGY - GROUP THERAPY NOTE - NSBHPSYCHOLPARTICIPCOMMENT_PSY_A_CORE FT
Pt. arrived on time to DBT skills group and fully participated in mindfulness exercise. During skills check in, pt. shared that she is unfamiliar with DBT skills, as this was her first DBT group. Pt. shared that in terms of coping skills, she used to use creative writing as a coping skill but reported that prior to admission she has not engaged much in this, as it has been hard to focus. Pt. spontaneously participated in skill discussion and shared relevant personal details and experiences. In particular, pt. discussed the ways in which her actions can at times be misunderstood by others, and she provided a specific example of a friend misunderstanding pt's lack of a desire to communicate often via text message as a lack of interest in the friendship. Pt. reported this was invalidating. Pt. additionally shared that she often struggles with validation and receiving validation, support and compliments because in her life, she has found that doing so can lead to disappointment. Pt. was pleasant, cooperative and engaged throughout group and was receptive to feedback and support. 
The pt arrived to group on time and was engaged and cooperative. The pt introduced herself to the group and in response to whether she used any coping skills over the past days, the pt said she didn’t need to use any coping skills over the past days. She was attentive throughout the mindfulness exercise and during the distress tolerance module. The pt did not spontaneously talk during the group. 
Pt was cooperative throughout DBT group, participating in a meditation and mindfulness exercise as well as learning "What" DBT skills.

## 2021-11-15 NOTE — BH INPATIENT PSYCHIATRY PROGRESS NOTE - NSBHFUPINTERVALHXFT_PSY_A_CORE
Pt is being seen for panic attacks, depression and passive SI. Care was discussed with the interdisciplinary care team.     Pt continues to receive lamotrigine. She denies any SE, no rash. She had a BM yesterday, though prefers to continue with senna since she has a history of constipation. Pt states she had short periods of feeling lonely yesterday since some of the pts on the floor were discharged. She notes using her book and journal as coping mechanisms for this loneliness. Denies any SI or self-injurious thoughts. States she overall still feels "less sad" than before she came, but she finds it difficult to assess. She does endorse moments of intrusive thoughts about how her mom would feel if she (the pt) was no longer here. But states that she has an "active imagination" and continuously daydreams about many scenarios making these feel insignificant to her. She states before admission, the intrusive thoughts she was having were much stronger, lasted longer and would preoccupy her, not similar all all to the momentary thoughts she had yesterday.    She says she has not had any AH, VH or paranoia since being here.  She says she had difficulty sleeping last note though she tried and could not maintain sleep. She reports this is usual for her, only getting around 5 hours per night. She plans to not take melatonin over the weekend to see if this improves sleep quality. Enjoyed group therapy yesterday. Her mother is becoming more open to helping the pt, asking how she can be more mindful and helpful at home to make things easier for her. Pt said this conversation went well. Pt is seen for follow up for panic attacks, depression and passive SI. Care was discussed with the interdisciplinary care team.     Pt continues to receive lamotrigine. She denies any SE, no rash. She reports she slept better last night, 8 hours approximately.  No somatic complaints.  Pt states being able to utilize coping strategies, reports doing a puzzle to be relaxing for her.  Denies any SI or self-injurious thoughts. States she overall still feels better than before she came, but she finds it difficult to explain. She does endorse moments of intrusive thoughts at times but states before admission, the intrusive thoughts she was having were much stronger, lasted longer and would preoccupy her.  No HI or violent thoughts, no manic sx, no AH/VH, no agitation noted.    She says she has not had any AH, VH or paranoia since being here. Attending groups with benefit.
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
## 2021-11-15 NOTE — BH PSYCHOLOGY - GROUP THERAPY NOTE - NSPSYCHOLGRPDBTMIND_PSY_A_CORE FT
n/a
Pt attended the DBT Skills Group which is held daily and focused on the mindfulness skill- "What Skills." Group leaders started with a meditation to encourage deep breathing, observation of thoughts and sensations, and mindfulness. Today’s group reviewed how to take hold of one’s mind through “What” skills including three components: how to observe one’s emotions, how to describe one’s thoughts and emotions, and how to participate fully in the moment. Leaders also reviewed the concept of wise mind and going with the flow. Participants were engaged, interacted well with each other, and provided personal examples and experiences of observing one’s feelings, describing the feelings and participating fully in the moment without judgement.   
n/a

## 2021-11-15 NOTE — BH PSYCHOLOGY - GROUP THERAPY NOTE - NSBHPSYCHOLRESPONSE_PSY_A_CORE
Coping skills acquired/Accepted support

## 2021-11-15 NOTE — BH INPATIENT PSYCHIATRY PROGRESS NOTE - NSBHCHARTREVIEWVS_PSY_A_CORE FT
Vital Signs Last 24 Hrs  T(C): 36.7 (11-15-21 @ 07:54), Max: 36.9 (11-14-21 @ 17:55)  T(F): 98 (11-15-21 @ 07:54), Max: 98.4 (11-14-21 @ 17:55)  HR: --  BP: --  BP(mean): --  RR: --  SpO2: --    Orthostatic VS  11-15-21 @ 07:54  Lying BP: --/-- HR: --  Sitting BP: 105/70 HR: 86  Standing BP: 108/67 HR: 107  Site: upper left arm  Mode: electronic  Orthostatic VS  11-14-21 @ 08:09  Lying BP: --/-- HR: --  Sitting BP: 101/65 HR: 86  Standing BP: 102/69 HR: 98  Site: --  Mode: --   Vital Signs Last 24 Hrs  T(C): 36.6 (11-15-21 @ 17:43), Max: 36.7 (11-15-21 @ 07:54)  T(F): 97.8 (11-15-21 @ 17:43), Max: 98 (11-15-21 @ 07:54)  HR: --  BP: --  BP(mean): --  RR: --  SpO2: --    Orthostatic VS  11-15-21 @ 07:54  Lying BP: --/-- HR: --  Sitting BP: 105/70 HR: 86  Standing BP: 108/67 HR: 107  Site: upper left arm  Mode: electronic  Orthostatic VS  11-14-21 @ 08:09  Lying BP: --/-- HR: --  Sitting BP: 101/65 HR: 86  Standing BP: 102/69 HR: 98  Site: --  Mode: --

## 2021-11-15 NOTE — BH INPATIENT PSYCHIATRY PROGRESS NOTE - ADDITIONAL DETAILS / COMMENTS
Pt continues to be somatically preoccupied. 
Pts is spiritually focused, expressing loose associations between events happening around and how she feels and how this affects her body
English
Pt continues to be somatically preoccupied.
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
## 2021-11-15 NOTE — BH PSYCHOLOGY - GROUP THERAPY NOTE - NSPSYCHOLGRPDBTGOAL_PSY_A_CORE
reduce mood and affective lability/reduce impulsive self-defeating behavior/reduce interpersonal conflicts/improve ability to indentify feelings/improve ability to communicate feelings
reduce mood and affective lability/reduce impulsive self-defeating behavior
reduce mood and affective lability/improve ability to indentify feelings/reduce vulnerability to emotional dysregualation

## 2021-11-15 NOTE — BH PSYCHOLOGY - GROUP THERAPY NOTE - NSPSYCHOLGRPDBTINT_PSY_A_CORE
reveiwed mindfullness homework
reviewed interpersonal effectiveness homework
reviewed distress tolerance, skills homework

## 2021-11-15 NOTE — BH PSYCHOLOGY - GROUP THERAPY NOTE - NSPSYCHOLGRPDBTPT_PSY_A_CORE
stable mood and affect in group/no self-destructive impulses/behaviors
no self-destructive behaviors/impulses reported/Patient able to identify mood states
stable mood and affect in group/patient showing good behavior control/no self-destructive behaviors/impulses reported

## 2021-11-16 DIAGNOSIS — Z71.89 OTHER SPECIFIED COUNSELING: ICD-10-CM

## 2021-11-16 PROCEDURE — 99231 SBSQ HOSP IP/OBS SF/LOW 25: CPT

## 2021-11-16 PROCEDURE — 90834 PSYTX W PT 45 MINUTES: CPT

## 2021-11-16 RX ORDER — MULTIVIT-MIN/FERROUS GLUCONATE 9 MG/15 ML
1 LIQUID (ML) ORAL
Qty: 0 | Refills: 0 | DISCHARGE
Start: 2021-11-16

## 2021-11-16 RX ORDER — SENNA PLUS 8.6 MG/1
2 TABLET ORAL
Qty: 60 | Refills: 0
Start: 2021-11-16 | End: 2021-12-15

## 2021-11-16 RX ORDER — LAMOTRIGINE 25 MG/1
1 TABLET, ORALLY DISINTEGRATING ORAL
Qty: 30 | Refills: 0
Start: 2021-11-16 | End: 2021-12-15

## 2021-11-16 RX ORDER — IBUPROFEN 200 MG
1 TABLET ORAL
Qty: 0 | Refills: 0 | DISCHARGE
Start: 2021-11-16

## 2021-11-16 RX ADMIN — Medication 650 MILLIGRAM(S): at 11:37

## 2021-11-16 RX ADMIN — Medication 650 MILLIGRAM(S): at 13:56

## 2021-11-16 RX ADMIN — SENNA PLUS 2 TABLET(S): 8.6 TABLET ORAL at 20:16

## 2021-11-16 RX ADMIN — Medication 5 MILLIGRAM(S): at 20:16

## 2021-11-16 RX ADMIN — Medication 1 TABLET(S): at 08:57

## 2021-11-16 RX ADMIN — LAMOTRIGINE 25 MILLIGRAM(S): 25 TABLET, ORALLY DISINTEGRATING ORAL at 08:58

## 2021-11-16 NOTE — BH PSYCHOLOGY - CLINICIAN PSYCHOTHERAPY NOTE - NSBHPSYCHOLGOALS_PSY_A_CORE
Improve level of independent functioning/Improve social/vocational/coping skills/Prevent relapse/Treatment compliance
Assessment/Improve level of independent functioning/Improve social/vocational/coping skills/Prevent relapse/Psychoeducation

## 2021-11-16 NOTE — BH INPATIENT PSYCHIATRY PROGRESS NOTE - NSBHCHARTREVIEWVS_PSY_A_CORE FT
Vital Signs Last 24 Hrs  T(C): 36.8 (11-16-21 @ 17:52), Max: 36.8 (11-16-21 @ 17:52)  T(F): 98.2 (11-16-21 @ 17:52), Max: 98.2 (11-16-21 @ 17:52)  HR: --  BP: --  BP(mean): --  RR: 18 (11-16-21 @ 07:59) (18 - 18)  SpO2: --    Orthostatic VS  11-16-21 @ 07:59  Lying BP: --/-- HR: --  Sitting BP: 105/69 HR: 94  Standing BP: 109/69 HR: 102  Site: --  Mode: --  Orthostatic VS  11-15-21 @ 07:54  Lying BP: --/-- HR: --  Sitting BP: 105/70 HR: 86  Standing BP: 108/67 HR: 107  Site: upper left arm  Mode: electronic   Vital Signs Last 24 Hrs  T(C): 36.4 (11-17-21 @ 07:51), Max: 36.4 (11-17-21 @ 07:51)  T(F): 97.6 (11-17-21 @ 07:51), Max: 97.6 (11-17-21 @ 07:51)  HR: --  BP: --  BP(mean): --  RR: 19 (11-17-21 @ 07:51) (19 - 19)  SpO2: --    Orthostatic VS  11-17-21 @ 07:51  Lying BP: --/-- HR: --  Sitting BP: 112/65 HR: 85  Standing BP: 110/62 HR: 80  Site: --  Mode: --  Orthostatic VS  11-16-21 @ 07:59  Lying BP: --/-- HR: --  Sitting BP: 105/69 HR: 94  Standing BP: 109/69 HR: 102  Site: --  Mode: --

## 2021-11-16 NOTE — BH DISCHARGE NOTE NURSING/SOCIAL WORK/PSYCH REHAB - DISCHARGE INSTRUCTIONS AFTERCARE APPOINTMENTS
In order to check the location, date, or time of your aftercare appointment, please refer to your Discharge Instructions Document given to you upon leaving the hospital.  If you have lost the instructions please call [phone]
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
## 2021-11-16 NOTE — BH INPATIENT PSYCHIATRY PROGRESS NOTE - PRN MEDS
MEDICATIONS  (PRN):  acetaminophen     Tablet .. 650 milliGRAM(s) Oral every 6 hours PRN Mild Pain (1 - 3), Moderate Pain (4 - 6)  aluminum hydroxide/magnesium hydroxide/simethicone Suspension 30 milliLiter(s) Oral every 6 hours PRN Dyspepsia  hydrOXYzine hydrochloride 50 milliGRAM(s) Oral every 6 hours PRN anxiety  ibuprofen  Tablet. 600 milliGRAM(s) Oral every 6 hours PRN Moderate Pain (4 - 6), Severe Pain (7 - 10)  LORazepam     Tablet 1 milliGRAM(s) Oral every 6 hours PRN agitation  LORazepam   Injectable 1 milliGRAM(s) IntraMuscular once PRN severe agitation  melatonin. 5 milliGRAM(s) Oral at bedtime PRN Insomnia   MEDICATIONS  (PRN):

## 2021-11-16 NOTE — BH DISCHARGE NOTE NURSING/SOCIAL WORK/PSYCH REHAB - NSBHDCAGENCY1FT_PSY_A_CORE
GYN H&P     2019  2:04 PM    CC: Patient presents with:  Prenatal Care: Reivew us. HPI: patient is a 33year old  here for Patient presents with:  Prenatal Care: Reivew us. Pt with secondary amenorrhea.  Positive pregnancy test.  H/o Smokeless tobacco: Never Used    Substance and Sexual Activity      Alcohol use: No        Frequency: Never        Comment: not when preg      Drug use: No      Sexual activity: Yes        Partners: Male    Other Topics      Concerns:         Servi counseling patient regarding treatment options, risks and benefits of treatment and possible complications. All concerns were addressed and all questions were answered.      Zayda Villanueva MD [place] [facility]

## 2021-11-16 NOTE — BH DISCHARGE NOTE NURSING/SOCIAL WORK/PSYCH REHAB - PATIENT PORTAL LINK FT
You can access the FollowMyHealth Patient Portal offered by [facility] by registering at the following website: [email or web address]. By joining [facility]’s FollowMyHealth portal, you will also be able to view your health information using other applications (apps) compatible with our system.

## 2021-11-16 NOTE — BH DISCHARGE NOTE NURSING/SOCIAL WORK/PSYCH REHAB - NSDCPRGOAL_PSY_ALL_CORE
Writer met with patient to discuss patient’s progress throughout her inpatient stay. Upon admission, patient presented with worsening anxiety (i.e. panic attacks), depression, and passive SI. During her inpatient stay, patient was visible on the unit, participated in 32% of groups, and exhibited positive interactions with peers. At discharge, patient presents as calm and reported having concerns about her ability to manage physical symptoms of anxiety. Patient reported feeling hopeful that outpatient therapy will support her ability to manage anxiety. In groups, patient was pleasant, engaged, and insightful. Patient preferred creative arts and leisure activities and often did not attend discussion-based groups. Patient made some progress towards her psychiatric rehabilitation goal evidenced by her ability to identify doing puzzles, listening to music, reading, and journaling as effective coping skills. Patient exhibits medication compliance, good ADLs, and good behavioral control. Patient denied SI, HI, AH, and VH.

## 2021-11-16 NOTE — BH PSYCHOLOGY - CLINICIAN PSYCHOTHERAPY NOTE - NSBHPSYCHOLINT_PSY_A_CORE
Dialectical  Behavioral Therapy (DBT)/Dynamic issues addressed/Problem-solving techniques discussed/Supported coping skills/Supportive therapy/Treatment compliance encouraged
Dialectical  Behavioral Therapy (DBT)/Dynamic issues addressed/Problem-solving techniques discussed/Supported coping skills/Supportive therapy

## 2021-11-16 NOTE — BH PSYCHOLOGY - CLINICIAN PSYCHOTHERAPY NOTE - NSBHPSYCHOLPROBS_PSY_ALL_CORE
Academic/Vocational/Social Dysfunction/Anxiety/Depression/Impulsivity/Self Injurious Behavior/Substance Abuse/Suicidality
Academic/Vocational/Social Dysfunction/Anger/Irritability/Anxiety/Depression/Impulsivity/Substance Abuse

## 2021-11-16 NOTE — BH INPATIENT PSYCHIATRY PROGRESS NOTE - CURRENT MEDICATION
MEDICATIONS  (STANDING):  lamoTRIgine 25 milliGRAM(s) Oral daily  multivitamin/minerals 1 Tablet(s) Oral daily  senna 2 Tablet(s) Oral at bedtime    MEDICATIONS  (PRN):  acetaminophen     Tablet .. 650 milliGRAM(s) Oral every 6 hours PRN Mild Pain (1 - 3), Moderate Pain (4 - 6)  aluminum hydroxide/magnesium hydroxide/simethicone Suspension 30 milliLiter(s) Oral every 6 hours PRN Dyspepsia  hydrOXYzine hydrochloride 50 milliGRAM(s) Oral every 6 hours PRN anxiety  ibuprofen  Tablet. 600 milliGRAM(s) Oral every 6 hours PRN Moderate Pain (4 - 6), Severe Pain (7 - 10)  LORazepam     Tablet 1 milliGRAM(s) Oral every 6 hours PRN agitation  LORazepam   Injectable 1 milliGRAM(s) IntraMuscular once PRN severe agitation  melatonin. 5 milliGRAM(s) Oral at bedtime PRN Insomnia   MEDICATIONS  (STANDING):    MEDICATIONS  (PRN):

## 2021-11-16 NOTE — BH PSYCHOLOGY - CLINICIAN PSYCHOTHERAPY NOTE - NSBHPSYCHOLNARRATIVE_PSY_A_CORE FT
Writer met with Pt for initial individual session focused on her treatment progress and transitioning from the hospital to outpatient treatment. Pt was pleasant and cooperative, engaged and organized. Session focused on Pt's reflection about her hospitalization and past stressors and efforts to cope without formal treatment. Pt spoke about her difficulty expressing sadness and anger and ways that she keeps busy but also uses escape and distraction to cope with overwhelming emotions and stressors. Pt spoke openly about a history of difficulty in relationships and her ambivalence about love, intimacy, and vulnerability. Writer and pt discussed potential goals and expectations for long-term treatment as this will be her first therapy experience and Pt stated that she wants to explore and try to practice being more emotionally vulnerable. Writer also introduced safety planning to Pt in preparation for anticipated discharge. No SI elicited and she reported positive experiences on the unit and with group sessions. 
Writer met with Pt for individual session focused on discharge and safety planning. Pt was cooperative and engaged, talkative throughout session with full range euthymic affect. Pt was able to engage in safety planning identifying warning signs and coping skills as well as supports and resources. Writer and Pt discussed realistic supports and her conflicted feelings about her mother as a support, feeling cautious and not trusting of her based on past experiences but also acknowledging that her mother wants to help her and has changed in many ways.  Writer tried to help Pt identify goals and reinforce areas for further exploration in outpatient therapy as well as anticipated challenges given her difficulty with vulnerability and expressing emotions. Pt was receptive and spoke about how she has "big goals" but explained being fearful of talking about her goals because they were frequently changing. However, Pt spoke about her desire to travel the world as a long-term goal. Pt spoke about her experience in the hospital as positive and different from what she had expected. She verbalized motivation for outpatient treatment. No SI, plan, or intent. Pt was future and goal oriented. No psychosis or shine observed. She has been attending groups and interacting with peers. Pt will receive a copy of her safety plan prior to discharge.

## 2021-11-16 NOTE — BH DISCHARGE NOTE NURSING/SOCIAL WORK/PSYCH REHAB - NSDCPRRECOMMEND_PSY_ALL_CORE
Psychiatric Rehabilitation staff recommends that patient engage in outpatient services for continued medication and symptom management.

## 2021-11-16 NOTE — BH INPATIENT PSYCHIATRY PROGRESS NOTE - NSBHFUPINTERVALHXFT_PSY_A_CORE
Pt is seen for follow up for panic attacks, depression and passive SI. Care was discussed with the interdisciplinary care team.     Pt continues to receive lamotrigine. She denies any SE, no rash. She reports she slept better last night, 8 hours approximately.  No somatic complaints.  Pt states being able to utilize coping strategies, reports doing a puzzle to be relaxing for her.  Denies any SI or self-injurious thoughts. States she overall still feels better than before she came, but she finds it difficult to explain. She does endorse moments of intrusive thoughts at times but states before admission, the intrusive thoughts she was having were much stronger, lasted longer and would preoccupy her.  No HI or violent thoughts, no manic sx, no AH/VH, no agitation noted.    She says she has not had any AH, VH or paranoia since being here. Attending groups with benefit. Pt is seen for follow up for panic attacks, depression and passive SI. Care was discussed with the interdisciplinary care team.     Pt continues to take meds as prescribed, tolerating lamotrigine well. She denies any SE, no rash. She reports her sleep and appetite is fair.  No somatic complaints.  Pt states being able to utilize coping strategies, reports doing a puzzle to be relaxing.  Denies any SI or self-injurious thoughts. States she overall feels better. She does endorse moments of intrusive thoughts at times but states able to control them and denies urge to act on them.  No HI or violent thoughts, no manic sx, no AH/VH, no agitation noted.  Attending groups with benefit.

## 2021-11-16 NOTE — BH DISCHARGE NOTE NURSING/SOCIAL WORK/PSYCH REHAB - NSCDUDCCRISIS_PSY_A_CORE
[facility]  1 [phone]) [facility]-WELL ([phone])  Text "WELL" to 35502  Website: www.[employer]/.[facility] 1 (495) 761-RIJU (7311) Website: www.safehorizon.org/.National Suicide Prevention Lifeline 9 (577) 350-3297/.  [facility]  1 (676) LIFENET (666-3141)/.  [facility]  [address] [place] [id number] [phone]   Hours:  Monday through Friday from 9 AM to 3 PM/.  [facility] - [facility] Crisis Line  [phone], Option 1

## 2021-11-17 VITALS — RESPIRATION RATE: 19 BRPM | TEMPERATURE: 98 F

## 2021-11-17 PROCEDURE — 99238 HOSP IP/OBS DSCHRG MGMT 30/<: CPT

## 2021-11-17 RX ADMIN — Medication 1 TABLET(S): at 09:43

## 2021-11-17 RX ADMIN — LAMOTRIGINE 25 MILLIGRAM(S): 25 TABLET, ORALLY DISINTEGRATING ORAL at 09:43

## 2021-11-17 RX ADMIN — Medication 650 MILLIGRAM(S): at 12:36
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
## 2021-11-17 NOTE — BH INPATIENT PSYCHIATRY PROGRESS NOTE - NSBHCHARTREVIEWLAB_PSY_A_CORE FT
13.4   8.96  )-----------( 342      ( 08 Nov 2021 14:25 )             42.7     HCG Quantitative, Serum (11.08.21 @ 14:25)   HCG Quantitative, Serum: <5.0    Thyroid Stimulating Hormone, Serum (11.08.21 @ 14:25)   Thyroid Stimulating Hormone, Serum: 1.55 uIU/mL     Comprehensive Metabolic Panel (11.08.21 @ 14:25)   Sodium, Serum: 137 mmol/L   Potassium, Serum: 3.9 mmol/L   Chloride, Serum: 102 mmol/L   Carbon Dioxide, Serum: 26 mmol/L   Anion Gap, Serum: 9 mmol/L   Blood Urea Nitrogen, Serum: 11 mg/dL   Creatinine, Serum: 0.94 mg/dL   Glucose, Serum: 88 mg/dL   Calcium, Total Serum: 9.0 mg/dL   Protein Total, Serum: 7.0 g/dL   Albumin, Serum: 4.2 g/dL   Bilirubin Total, Serum: 0.3 mg/dL   Alkaline Phosphatase, Serum: 94 U/L   Aspartate Aminotransferase (AST/SGOT): 13 U/L   Alanine Aminotransferase (ALT/SGPT): 10 U/L   eGFR if Non African American: 84    Urine Microscopic-Add On (NC) (11.08.21 @ 14:25)   Epithelial Cells: 27 /HPF   Bacteria: Moderate   Hyaline Casts: 4 /LPF   White Blood Cell - Urine: 3 /HPF   Red Blood Cell - Urine: 3 /HPF   
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
## 2021-11-17 NOTE — BH INPATIENT PSYCHIATRY PROGRESS NOTE - NSBHCONTPROVIDER_PSY_ALL_CORE
Not applicable

## 2021-11-17 NOTE — BH INPATIENT PSYCHIATRY PROGRESS NOTE - NSICDXBHPRIMARYDX_PSY_ALL_CORE
Bipolar 2 disorder, major depressive episode   F31.13  
Bipolar 2 disorder, major depressive episode   F31.73  
Bipolar 2 disorder, major depressive episode   F31.61  
Bipolar 2 disorder, major depressive episode   F31.01  
Bipolar 2 disorder, major depressive episode   F31.50  
Bipolar 2 disorder, major depressive episode   F31.92  
Bipolar 2 disorder, major depressive episode   F31.43

## 2021-11-17 NOTE — BH INPATIENT PSYCHIATRY DISCHARGE NOTE - NSDCMRMEDTOKEN_GEN_ALL_CORE_FT
ibuprofen 600 mg oral tablet: 1 tab(s) orally every 6 hours, As needed, Moderate Pain (4 - 6), Severe Pain (7 - 10)  lamoTRIgine 25 mg oral tablet: 1 tab(s) orally once a day  Multiple Vitamins with Minerals oral tablet: 1 tab(s) orally once a day  senna oral tablet: 2 tab(s) orally once a day (at bedtime)

## 2021-11-17 NOTE — BH INPATIENT PSYCHIATRY PROGRESS NOTE - NSTXANXGOAL_PSY_ALL_CORE
Identify and practice 3 coping skills to manage anxiety
Be able to participate in activities despite lingering anxiety/panic
Identify and practice 3 coping skills to manage anxiety

## 2021-11-17 NOTE — BH INPATIENT PSYCHIATRY PROGRESS NOTE - NSDCCRITERIA_PSY_ALL_CORE
CGI less than or equal to 3
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
## 2021-11-17 NOTE — BH INPATIENT PSYCHIATRY PROGRESS NOTE - NSTXDCOPLKINTERMD_PSY_ALL_CORE
psychopharmacologic treatment/ group, milieu therapy

## 2021-11-17 NOTE — BH INPATIENT PSYCHIATRY PROGRESS NOTE - NSBHCHARTREVIEWINVESTIGATE_PSY_A_CORE FT
< from: CT Head No Cont (11.08.21 @ 14:49) >    EXAM:  CT BRAIN        PROCEDURE DATE:  Nov 8 2021         INTERPRETATION:  CLINICAL INDICATION: Psychosis.    TECHNIQUE: CT of the head was performed without the administration of intravenous contrast.    COMPARISON: None available.    FINDINGS:  No acute transcortical infarction or acute intracranial hemorrhage.    No hydrocephalus. No extra-axial fluid collections.    The visualized intraorbital contents are unremarkable. The imaged portions of the paranasal sinuses are clear. The mastoid air cells are clear. The visualized soft tissues and osseous structures appear normal.    IMPRESSION:    -No acute intracranial findings.    < end of copied text >    EKG conducted in the ED 11/8: wnl

## 2021-11-17 NOTE — BH INPATIENT PSYCHIATRY DISCHARGE NOTE - DESCRIPTION
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

## 2021-11-17 NOTE — BH INPATIENT PSYCHIATRY PROGRESS NOTE - NSTXDEPRESGOAL_PSY_ALL_CORE
Attend and participate in at least 2 groups daily despite low mood/energy

## 2021-11-17 NOTE — BH INPATIENT PSYCHIATRY PROGRESS NOTE - NSBHCHARTREVIEWVS_PSY_A_CORE FT
Vital Signs Last 24 Hrs  T(C): 36.4 (11-17-21 @ 07:51), Max: 36.4 (11-17-21 @ 07:51)  T(F): 97.6 (11-17-21 @ 07:51), Max: 97.6 (11-17-21 @ 07:51)  HR: --  BP: --  BP(mean): --  RR: 19 (11-17-21 @ 07:51) (19 - 19)  SpO2: --    Orthostatic VS  11-17-21 @ 07:51  Lying BP: --/-- HR: --  Sitting BP: 112/65 HR: 85  Standing BP: 110/62 HR: 80  Site: --  Mode: --  Orthostatic VS  11-16-21 @ 07:59  Lying BP: --/-- HR: --  Sitting BP: 105/69 HR: 94  Standing BP: 109/69 HR: 102  Site: --  Mode: --

## 2021-11-17 NOTE — BH INPATIENT PSYCHIATRY DISCHARGE NOTE - NSDCCPCAREPLAN_GEN_ALL_CORE_FT
PRINCIPAL DISCHARGE DIAGNOSIS  Diagnosis: Bipolar 2 disorder, major depressive episode  Assessment and Plan of Treatment:

## 2021-11-17 NOTE — BH INPATIENT PSYCHIATRY DISCHARGE NOTE - HOSPITAL COURSE
xxx Pt continues to take meds as prescribed, tolerating lamotrigine well. She denies any SE, no rash. She reports her sleep and appetite is fair.  No somatic complaints.  Pt states being able to utilize coping strategies, future oriented and optimistic about discharge and her future.  Denies any SI or self-injurious thoughts. States she overall feels better.  No HI or violent thoughts, no manic sx, no AH/VH, no agitation noted.  Attending groups with benefit.    The patient has continued to show improvement in symptoms.  She states her depression is much improved, and she denies any anxiety.  She denies any SI, and her behavior has been well controlled.  The patient has been sleeping better, without nightmares.  The patient has been fully engaged in treatment on the unit, compliant with medications, and is looking forward to continuing care as an outpatient.  The patient has support from her family, who are also protective factors for her.  She was able to safety plan appropriately.  The patient’s risk cannot be further decreased with continued inpatient hospitalization.  She is no longer an acute danger to herself or others, and is stable for discharge home.    Discharge medications: Lamotrigine 25mg daily, Senna 2 tablets QHS, Multivitamin daily, Ibuprofen prn pain 26yr old  American female who was BIB mom to ED for worsening anxietywhich resulted in psychiatric admission on a voluntary legal status. Patient reported worsening depression and anxiety for the last month s/p a viral infection. She reported that her anxiety with associated panic attacks has increased in intensity and had started to develop intrusive thoughts of seeing herself die (hanging, dying in a plane crash etc). Patient admitted to a period of high energy in 2019 with impulsive behaviors, increased goal directed activity. Pt also recently had thoughts of engaging in NSSIB, though has not acted on NSSIB since 2019.  Pt was not currently in treatment and required inpatient psychiatric hospitalization for safety and stabilization.  Patient started on Lamotrigine and has taken meds as prescribed, tolerating lamotrigine well. She denies any side effects, no rash. She reports her sleep and appetite are fair.  Pt benefitted form group program and states being able to utilize coping strategies.  Patient noted to be future oriented and optimistic about discharge and her future.  Denies any SI or self-injurious thoughts. States she overall feels better.  No HI or violent thoughts, no manic sx, no AH/VH, no agitation noted.     The patient has continued to show improvement in symptoms.  She states her depression is much improved, and she denies any anxiety.  She denies any SI, and her behavior has been well controlled.  The patient has been sleeping better, without nightmares.  The patient has been fully engaged in treatment on the unit, compliant with medications, and is looking forward to continuing care as an outpatient.  The patient has support from her family, who are also protective factors for her.  She was able to safety plan appropriately.  The patient’s risk cannot be further decreased with continued inpatient hospitalization.  She is no longer an acute danger to herself or others, and is stable for discharge home.    Discharge medications: Lamotrigine 25mg daily, Senna 2 tablets QHS, Multivitamin daily, Ibuprofen prn pain 26yr old  American female who was BIB mom to ED for worsening anxietywhich resulted in psychiatric admission on a voluntary legal status. Patient reported worsening depression and anxiety for the last month s/p a viral infection. She reported that her anxiety with associated panic attacks has increased in intensity and had started to develop intrusive thoughts of seeing herself die (hanging, dying in a plane crash etc). Patient admitted to a period of high energy in 2019 with impulsive behaviors, increased goal directed activity. Pt also recently had thoughts of engaging in NSSIB, though has not acted on NSSIB since 2019.  Pt was not currently in treatment and required inpatient psychiatric hospitalization for safety and stabilization.  Patient started on Lamotrigine and has taken meds as prescribed, tolerating lamotrigine well. She denies any side effects, no rash. She reports her sleep and appetite are fair.  Pt benefitted form group program and states being able to utilize coping strategies.  Patient noted to be future oriented and optimistic about discharge and her future.  Denies any SI or self-injurious thoughts. States she overall feels better.  No HI or violent thoughts, no manic sx, no AH/VH, no agitation noted.     The patient has continued to show improvement in symptoms.  She states her depression is much improved, and she denies any anxiety.  She denies any SI, and her behavior has been well controlled.  The patient has been sleeping better, without nightmares.  The patient has been fully engaged in treatment on the unit, compliant with medications, and is looking forward to continuing care as an outpatient.  The patient has support from her family, who are also protective factors for her.  She was able to safety plan appropriately.  The patient’s risk cannot be further decreased with continued inpatient hospitalization.  She is no longer an acute danger to herself or others, and is stable for discharge home.    Discharge medications: Lamotrigine 25mg daily, Senna 2 tablets QHS, Multivitamin daily, Ibuprofen prn pain    Aftercare Appointment  [facility]   22-Nov-2021 10:00  [address] [place] [address] [phone]  Mental Health Treatment  Ambulatory Clinic

## 2021-11-17 NOTE — BH INPATIENT PSYCHIATRY PROGRESS NOTE - OTHER
ruminates on somatic symptoms
focused on spirituality, ruminates on somatic symptoms
ruminates on somatic symptoms
somatically preoccupied
ruminates on somatic symptoms
focused on spirituality
ruminates on somatic symptoms

## 2021-11-17 NOTE — BH INPATIENT PSYCHIATRY PROGRESS NOTE - NSBHFUPINTERVALHXFT_PSY_A_CORE
Pt is seen for follow up for bipolar II depression and passive SI. Care was discussed with the interdisciplinary care team.     Pt continues to take meds as prescribed, tolerating lamotrigine well. She denies any SE, no rash. She reports her sleep and appetite is fair.  No somatic complaints.  Pt states being able to utilize coping strategies, reports doing a puzzle to be relaxing.  Denies any SI or self-injurious thoughts. States she overall feels better. She does endorse moments of intrusive thoughts at times but states able to control them and denies urge to act on them.  No HI or violent thoughts, no manic sx, no AH/VH, no agitation noted.  Attending groups with benefit. Pt is seen for follow up for bipolar II depression and passive SI. Care was discussed with the interdisciplinary care team.  Pt continues to take meds as prescribed, tolerating lamotrigine well. She denies any SE, no rash. She reports her sleep and appetite is fair.  No somatic complaints.  Pt states being able to utilize coping strategies, future oriented and optimistic about discharge and her future.  Denies any SI or self-injurious thoughts. States she overall feels better.  No HI or violent thoughts, no manic sx, no AH/VH, no agitation noted.  Attending groups with benefit.    The patient has continued to show improvement in symptoms.  She states her depression is much improved, and she denies any anxiety.  She denies any SI, and her behavior has been well controlled.  The patient has been sleeping better, without nightmares.  The patient has been fully engaged in treatment on the unit, compliant with medications, and is looking forward to continuing care as an outpatient.  The patient has support from her family, who are also protective factors for her.  She was able to safety plan appropriately.  The patient’s risk cannot be further decreased with continued inpatient hospitalization.  She is no longer an acute danger to herself or others, and is stable for discharge home.

## 2021-12-01 PROCEDURE — G9005: CPT

## 2022-03-04 NOTE — ED ADULT NURSE NOTE - SUICIDE SCREENING QUESTION 2
No Consent 2/Introductory Paragraph: Mohs surgery was explained to the patient and consent was obtained. The risks, benefits and alternatives to therapy were discussed in detail. Specifically, the risks of infection, scarring, bleeding, prolonged wound healing, incomplete removal, allergy to anesthesia, nerve injury and recurrence were addressed. Prior to the procedure, the treatment site was clearly identified and confirmed by the patient. All components of Universal Protocol/PAUSE Rule completed.

## 2022-03-28 ENCOUNTER — EMERGENCY (EMERGENCY)
Facility: HOSPITAL | Age: 27
LOS: 1 days | Discharge: HOME | End: 2022-03-28
Attending: EMERGENCY MEDICINE
Payer: MEDICAID

## 2022-03-28 VITALS
WEIGHT: 199.08 LBS | SYSTOLIC BLOOD PRESSURE: 123 MMHG | DIASTOLIC BLOOD PRESSURE: 88 MMHG | RESPIRATION RATE: 19 BRPM | OXYGEN SATURATION: 99 % | TEMPERATURE: 98 F | HEART RATE: 84 BPM | HEIGHT: 67 IN

## 2022-03-28 LAB
ALBUMIN SERPL ELPH-MCNC: 3.4 G/DL — LOW (ref 3.5–5)
ALP SERPL-CCNC: 89 U/L — SIGNIFICANT CHANGE UP (ref 40–120)
ALT FLD-CCNC: 21 U/L DA — SIGNIFICANT CHANGE UP (ref 10–60)
ANION GAP SERPL CALC-SCNC: 4 MMOL/L — LOW (ref 5–17)
APPEARANCE UR: CLEAR — SIGNIFICANT CHANGE UP
AST SERPL-CCNC: 17 U/L — SIGNIFICANT CHANGE UP (ref 10–40)
BACTERIA # UR AUTO: ABNORMAL /HPF
BASOPHILS # BLD AUTO: 0.06 K/UL — SIGNIFICANT CHANGE UP (ref 0–0.2)
BASOPHILS NFR BLD AUTO: 0.5 % — SIGNIFICANT CHANGE UP (ref 0–2)
BILIRUB SERPL-MCNC: 0.6 MG/DL — SIGNIFICANT CHANGE UP (ref 0.2–1.2)
BILIRUB UR-MCNC: NEGATIVE — SIGNIFICANT CHANGE UP
BUN SERPL-MCNC: 11 MG/DL — SIGNIFICANT CHANGE UP (ref 7–18)
CALCIUM SERPL-MCNC: 8.4 MG/DL — SIGNIFICANT CHANGE UP (ref 8.4–10.5)
CHLORIDE SERPL-SCNC: 108 MMOL/L — SIGNIFICANT CHANGE UP (ref 96–108)
CO2 SERPL-SCNC: 25 MMOL/L — SIGNIFICANT CHANGE UP (ref 22–31)
COLOR SPEC: YELLOW — SIGNIFICANT CHANGE UP
CREAT SERPL-MCNC: 0.74 MG/DL — SIGNIFICANT CHANGE UP (ref 0.5–1.3)
D DIMER BLD IA.RAPID-MCNC: <150 NG/ML DDU — SIGNIFICANT CHANGE UP
DIFF PNL FLD: ABNORMAL
EGFR: 115 ML/MIN/1.73M2 — SIGNIFICANT CHANGE UP
EOSINOPHIL # BLD AUTO: 0.1 K/UL — SIGNIFICANT CHANGE UP (ref 0–0.5)
EOSINOPHIL NFR BLD AUTO: 0.8 % — SIGNIFICANT CHANGE UP (ref 0–6)
EPI CELLS # UR: SIGNIFICANT CHANGE UP /HPF
GLUCOSE SERPL-MCNC: 90 MG/DL — SIGNIFICANT CHANGE UP (ref 70–99)
GLUCOSE UR QL: NEGATIVE — SIGNIFICANT CHANGE UP
HCG UR QL: NEGATIVE — SIGNIFICANT CHANGE UP
HCT VFR BLD CALC: 41.4 % — SIGNIFICANT CHANGE UP (ref 34.5–45)
HGB BLD-MCNC: 13.3 G/DL — SIGNIFICANT CHANGE UP (ref 11.5–15.5)
IMM GRANULOCYTES NFR BLD AUTO: 0.2 % — SIGNIFICANT CHANGE UP (ref 0–1.5)
KETONES UR-MCNC: NEGATIVE — SIGNIFICANT CHANGE UP
LEUKOCYTE ESTERASE UR-ACNC: NEGATIVE — SIGNIFICANT CHANGE UP
LYMPHOCYTES # BLD AUTO: 23.5 % — SIGNIFICANT CHANGE UP (ref 13–44)
LYMPHOCYTES # BLD AUTO: 3.06 K/UL — SIGNIFICANT CHANGE UP (ref 1–3.3)
MCHC RBC-ENTMCNC: 28.5 PG — SIGNIFICANT CHANGE UP (ref 27–34)
MCHC RBC-ENTMCNC: 32.1 GM/DL — SIGNIFICANT CHANGE UP (ref 32–36)
MCV RBC AUTO: 88.7 FL — SIGNIFICANT CHANGE UP (ref 80–100)
MONOCYTES # BLD AUTO: 0.61 K/UL — SIGNIFICANT CHANGE UP (ref 0–0.9)
MONOCYTES NFR BLD AUTO: 4.7 % — SIGNIFICANT CHANGE UP (ref 2–14)
NEUTROPHILS # BLD AUTO: 9.15 K/UL — HIGH (ref 1.8–7.4)
NEUTROPHILS NFR BLD AUTO: 70.3 % — SIGNIFICANT CHANGE UP (ref 43–77)
NITRITE UR-MCNC: NEGATIVE — SIGNIFICANT CHANGE UP
NRBC # BLD: 0 /100 WBCS — SIGNIFICANT CHANGE UP (ref 0–0)
PH UR: 7 — SIGNIFICANT CHANGE UP (ref 5–8)
PLATELET # BLD AUTO: 354 K/UL — SIGNIFICANT CHANGE UP (ref 150–400)
POTASSIUM SERPL-MCNC: 3.7 MMOL/L — SIGNIFICANT CHANGE UP (ref 3.5–5.3)
POTASSIUM SERPL-SCNC: 3.7 MMOL/L — SIGNIFICANT CHANGE UP (ref 3.5–5.3)
PROT SERPL-MCNC: 7.2 G/DL — SIGNIFICANT CHANGE UP (ref 6–8.3)
PROT UR-MCNC: 30 MG/DL
RBC # BLD: 4.67 M/UL — SIGNIFICANT CHANGE UP (ref 3.8–5.2)
RBC # FLD: 14.6 % — HIGH (ref 10.3–14.5)
RBC CASTS # UR COMP ASSIST: SIGNIFICANT CHANGE UP /HPF (ref 0–2)
SODIUM SERPL-SCNC: 137 MMOL/L — SIGNIFICANT CHANGE UP (ref 135–145)
SP GR SPEC: 1.01 — SIGNIFICANT CHANGE UP (ref 1.01–1.02)
T4 AB SER-ACNC: 8.6 UG/DL — SIGNIFICANT CHANGE UP (ref 4.6–12)
TROPONIN I, HIGH SENSITIVITY RESULT: 17.9 NG/L — SIGNIFICANT CHANGE UP
TSH SERPL-MCNC: 1.24 UU/ML — SIGNIFICANT CHANGE UP (ref 0.34–4.82)
UROBILINOGEN FLD QL: NEGATIVE — SIGNIFICANT CHANGE UP
WBC # BLD: 13.01 K/UL — HIGH (ref 3.8–10.5)
WBC # FLD AUTO: 13.01 K/UL — HIGH (ref 3.8–10.5)
WBC UR QL: SIGNIFICANT CHANGE UP /HPF (ref 0–5)

## 2022-03-28 PROCEDURE — 85025 COMPLETE CBC W/AUTO DIFF WBC: CPT

## 2022-03-28 PROCEDURE — 80053 COMPREHEN METABOLIC PANEL: CPT

## 2022-03-28 PROCEDURE — 85379 FIBRIN DEGRADATION QUANT: CPT

## 2022-03-28 PROCEDURE — 96374 THER/PROPH/DIAG INJ IV PUSH: CPT

## 2022-03-28 PROCEDURE — 84443 ASSAY THYROID STIM HORMONE: CPT

## 2022-03-28 PROCEDURE — 84436 ASSAY OF TOTAL THYROXINE: CPT

## 2022-03-28 PROCEDURE — 99285 EMERGENCY DEPT VISIT HI MDM: CPT | Mod: 25

## 2022-03-28 PROCEDURE — 81001 URINALYSIS AUTO W/SCOPE: CPT

## 2022-03-28 PROCEDURE — 71046 X-RAY EXAM CHEST 2 VIEWS: CPT | Mod: 26

## 2022-03-28 PROCEDURE — 93005 ELECTROCARDIOGRAM TRACING: CPT

## 2022-03-28 PROCEDURE — 99285 EMERGENCY DEPT VISIT HI MDM: CPT

## 2022-03-28 PROCEDURE — 36415 COLL VENOUS BLD VENIPUNCTURE: CPT

## 2022-03-28 PROCEDURE — 93010 ELECTROCARDIOGRAM REPORT: CPT

## 2022-03-28 PROCEDURE — 71046 X-RAY EXAM CHEST 2 VIEWS: CPT

## 2022-03-28 PROCEDURE — 81025 URINE PREGNANCY TEST: CPT

## 2022-03-28 PROCEDURE — 84484 ASSAY OF TROPONIN QUANT: CPT

## 2022-03-28 RX ORDER — KETOROLAC TROMETHAMINE 30 MG/ML
15 SYRINGE (ML) INJECTION ONCE
Refills: 0 | Status: DISCONTINUED | OUTPATIENT
Start: 2022-03-28 | End: 2022-03-28

## 2022-03-28 RX ADMIN — Medication 15 MILLIGRAM(S): at 15:29

## 2022-03-28 RX ADMIN — Medication 15 MILLIGRAM(S): at 14:59

## 2022-03-28 NOTE — ED PROVIDER NOTE - OBJECTIVE STATEMENT
26 year old female with PMHx of anxiety, depression, and suicidal attempts (overdosed on pills at 14 years old) presenting to the ED with multiple medical complaints. Patient states that over the past month she has been experiencing an intermittent sensation of anterior chest pressure, shortness of breath, palpitations, and dizziness. Patient reports that her symptoms develop randomly mostly at night, and usually occur at the same time. Patient describes the dizziness as a mild spinning sensation which lasts for a few seconds at a time. Patient notes that the symptoms are not triggered with exercise or ambulation . Patient also reports noticing some intermittent left arm numbness over the past one month, and complains of intermittent headaches to different locations of her head over the past month. Patient endorses that she has been evaluated by a cardiologist for these symptoms approximately one week ago and is supposed to get an echo test, and was also evaluated by her PMD who tested her for allergies. Patient denies being more stressed than usual. Patient states that she noticed these symptoms developing in 10/2021 after she had flu-like symptoms, although she had tested negative for COVID-19 at the time. Patient endorses that she has been experiencing aforementioned symptoms since then, however has noted worsening of the symptoms over the past month. Patient denies any ETOH or drug abuse, and is not currently on any birth control. Patient denies any recent travel, immobilizations, or surgeries. Patient denies any SI, HI, hallucinations, and all other acute complaints. NKDA.

## 2022-03-28 NOTE — ED PROVIDER NOTE - NS ED ATTENDING STATEMENT MOD
This was a shared visit with the LUIS. I reviewed and verified the documentation and independently performed the documented:

## 2022-03-28 NOTE — ED PROVIDER NOTE - NSFOLLOWUPINSTRUCTIONS_ED_ALL_ED_FT
Follow up with the neurologist within 1 week.  Follow up with your psychiatrist within 1 week.  Follow up with your primary care doctor within 1-2 days.    **If you experience any new or worsening symptoms or if you are concerned you can always come back to the emergency for a re-evaluation.

## 2022-03-28 NOTE — ED PROVIDER NOTE - PATIENT PORTAL LINK FT
You can access the FollowMyHealth Patient Portal offered by [facility] by registering at the following website: [email or web address]. By joining [employer]’s FollowMyHealth portal, you will also be able to view your health information using other applications (apps) compatible with our system.

## 2022-03-28 NOTE — ED PROVIDER NOTE - PROGRESS NOTE DETAILS
Labs grossly unremarkable. CXR NAD. ECG NSR. On re-exam, patient denies any worsening depression or SI/HI. No indications for admission or further workup in the ER at this time. Patient has a psychiatrist. Will recommend to follow up with her psychiatrist within 1 week. Will also need to follow up with the neurologist within 1 week and PMD within 1-2 days. Pt is well appearing walking with steady gait, stable for discharge and follow up without fail with medical doctor. I had a detailed discussion with the patient and/or guardian regarding the historical points, exam findings, and any diagnostic results supporting the discharge diagnosis. Pt educated on care and need for follow up. Strict return instructions and red flag signs and symptoms discussed with patient. Questions answered. Pt shows understanding of discharge information and agrees to follow.

## 2022-03-28 NOTE — ED PROVIDER NOTE - CLINICAL SUMMARY MEDICAL DECISION MAKING FREE TEXT BOX
26 year old female presenting with multiple medical complaints. Patient appears nervous, and is afebrile and with vital signs within normal limits. Will check labs and urine, perform chest x-ray, and reassess.

## 2022-03-29 NOTE — BH INPATIENT PSYCHIATRY PROGRESS NOTE - NSTXANXDATEEST_PSY_ALL_CORE
Emergency Department Nursing Plan of Care       The Nursing Plan of Care is developed from the Nursing assessment and Emergency Department Attending provider initial evaluation. The plan of care may be reviewed in the ED Provider note.     The Plan of Care was developed with the following considerations:   Patient / Family readiness to learn indicated by:verbalized understanding  Persons(s) to be included in education: patient  Barriers to Learning/Limitations:No    Signed     Levi Lynn RN    3/29/2022   2:24 PM
09-Nov-2021

## 2022-04-09 ENCOUNTER — EMERGENCY (EMERGENCY)
Facility: HOSPITAL | Age: 27
LOS: 1 days | Discharge: HOME | End: 2022-04-09
Attending: EMERGENCY MEDICINE
Payer: MEDICAID

## 2022-04-09 VITALS
SYSTOLIC BLOOD PRESSURE: 105 MMHG | OXYGEN SATURATION: 98 % | HEART RATE: 74 BPM | TEMPERATURE: 98 F | DIASTOLIC BLOOD PRESSURE: 72 MMHG | RESPIRATION RATE: 19 BRPM

## 2022-04-09 VITALS — HEIGHT: 67 IN | WEIGHT: 130.07 LBS

## 2022-04-09 PROBLEM — F41.9 ANXIETY DISORDER, UNSPECIFIED: Chronic | Status: ACTIVE | Noted: 2022-03-28

## 2022-04-09 LAB
ALBUMIN SERPL ELPH-MCNC: 4.4 G/DL — SIGNIFICANT CHANGE UP (ref 3.3–5)
ALP SERPL-CCNC: 94 U/L — SIGNIFICANT CHANGE UP (ref 40–120)
ALT FLD-CCNC: 22 U/L — SIGNIFICANT CHANGE UP (ref 10–45)
ANION GAP SERPL CALC-SCNC: 13 MMOL/L — SIGNIFICANT CHANGE UP (ref 5–17)
AST SERPL-CCNC: 41 U/L — HIGH (ref 10–40)
BASOPHILS # BLD AUTO: 0.04 K/UL — SIGNIFICANT CHANGE UP (ref 0–0.2)
BASOPHILS NFR BLD AUTO: 0.5 % — SIGNIFICANT CHANGE UP (ref 0–2)
BILIRUB SERPL-MCNC: 0.2 MG/DL — SIGNIFICANT CHANGE UP (ref 0.2–1.2)
BUN SERPL-MCNC: 12 MG/DL — SIGNIFICANT CHANGE UP (ref 7–23)
CALCIUM SERPL-MCNC: 9.1 MG/DL — SIGNIFICANT CHANGE UP (ref 8.4–10.5)
CHLORIDE SERPL-SCNC: 104 MMOL/L — SIGNIFICANT CHANGE UP (ref 96–108)
CO2 SERPL-SCNC: 22 MMOL/L — SIGNIFICANT CHANGE UP (ref 22–31)
CREAT SERPL-MCNC: 0.74 MG/DL — SIGNIFICANT CHANGE UP (ref 0.5–1.3)
EGFR: 115 ML/MIN/1.73M2 — SIGNIFICANT CHANGE UP
EOSINOPHIL # BLD AUTO: 0.02 K/UL — SIGNIFICANT CHANGE UP (ref 0–0.5)
EOSINOPHIL NFR BLD AUTO: 0.3 % — SIGNIFICANT CHANGE UP (ref 0–6)
GLUCOSE SERPL-MCNC: 106 MG/DL — HIGH (ref 70–99)
HCG SERPL-ACNC: <2 MIU/ML — SIGNIFICANT CHANGE UP
HCT VFR BLD CALC: 42.1 % — SIGNIFICANT CHANGE UP (ref 34.5–45)
HGB BLD-MCNC: 13.7 G/DL — SIGNIFICANT CHANGE UP (ref 11.5–15.5)
IMM GRANULOCYTES NFR BLD AUTO: 0.4 % — SIGNIFICANT CHANGE UP (ref 0–1.5)
LYMPHOCYTES # BLD AUTO: 1.84 K/UL — SIGNIFICANT CHANGE UP (ref 1–3.3)
LYMPHOCYTES # BLD AUTO: 23.5 % — SIGNIFICANT CHANGE UP (ref 13–44)
MCHC RBC-ENTMCNC: 28.9 PG — SIGNIFICANT CHANGE UP (ref 27–34)
MCHC RBC-ENTMCNC: 32.5 GM/DL — SIGNIFICANT CHANGE UP (ref 32–36)
MCV RBC AUTO: 88.8 FL — SIGNIFICANT CHANGE UP (ref 80–100)
MONOCYTES # BLD AUTO: 0.39 K/UL — SIGNIFICANT CHANGE UP (ref 0–0.9)
MONOCYTES NFR BLD AUTO: 5 % — SIGNIFICANT CHANGE UP (ref 2–14)
NEUTROPHILS # BLD AUTO: 5.52 K/UL — SIGNIFICANT CHANGE UP (ref 1.8–7.4)
NEUTROPHILS NFR BLD AUTO: 70.3 % — SIGNIFICANT CHANGE UP (ref 43–77)
NRBC # BLD: 0 /100 WBCS — SIGNIFICANT CHANGE UP (ref 0–0)
PLATELET # BLD AUTO: 285 K/UL — SIGNIFICANT CHANGE UP (ref 150–400)
POTASSIUM SERPL-MCNC: 4.3 MMOL/L — SIGNIFICANT CHANGE UP (ref 3.5–5.3)
POTASSIUM SERPL-SCNC: 4.3 MMOL/L — SIGNIFICANT CHANGE UP (ref 3.5–5.3)
PROT SERPL-MCNC: 7 G/DL — SIGNIFICANT CHANGE UP (ref 6–8.3)
RBC # BLD: 4.74 M/UL — SIGNIFICANT CHANGE UP (ref 3.8–5.2)
RBC # FLD: 14.9 % — HIGH (ref 10.3–14.5)
SARS-COV-2 RNA SPEC QL NAA+PROBE: SIGNIFICANT CHANGE UP
SODIUM SERPL-SCNC: 139 MMOL/L — SIGNIFICANT CHANGE UP (ref 135–145)
WBC # BLD: 7.84 K/UL — SIGNIFICANT CHANGE UP (ref 3.8–10.5)
WBC # FLD AUTO: 7.84 K/UL — SIGNIFICANT CHANGE UP (ref 3.8–10.5)

## 2022-04-09 PROCEDURE — U0003: CPT

## 2022-04-09 PROCEDURE — 36415 COLL VENOUS BLD VENIPUNCTURE: CPT

## 2022-04-09 PROCEDURE — 76377 3D RENDER W/INTRP POSTPROCES: CPT

## 2022-04-09 PROCEDURE — U0005: CPT

## 2022-04-09 PROCEDURE — 96375 TX/PRO/DX INJ NEW DRUG ADDON: CPT

## 2022-04-09 PROCEDURE — 73630 X-RAY EXAM OF FOOT: CPT | Mod: 26,50

## 2022-04-09 PROCEDURE — 73590 X-RAY EXAM OF LOWER LEG: CPT

## 2022-04-09 PROCEDURE — 73562 X-RAY EXAM OF KNEE 3: CPT

## 2022-04-09 PROCEDURE — 73700 CT LOWER EXTREMITY W/O DYE: CPT | Mod: MA

## 2022-04-09 PROCEDURE — 99285 EMERGENCY DEPT VISIT HI MDM: CPT

## 2022-04-09 PROCEDURE — 99284 EMERGENCY DEPT VISIT MOD MDM: CPT | Mod: 25

## 2022-04-09 PROCEDURE — 84702 CHORIONIC GONADOTROPIN TEST: CPT

## 2022-04-09 PROCEDURE — 80053 COMPREHEN METABOLIC PANEL: CPT

## 2022-04-09 PROCEDURE — 82962 GLUCOSE BLOOD TEST: CPT

## 2022-04-09 PROCEDURE — 73562 X-RAY EXAM OF KNEE 3: CPT | Mod: 26,50

## 2022-04-09 PROCEDURE — 76377 3D RENDER W/INTRP POSTPROCES: CPT | Mod: 26

## 2022-04-09 PROCEDURE — 73610 X-RAY EXAM OF ANKLE: CPT | Mod: 26,50

## 2022-04-09 PROCEDURE — 73590 X-RAY EXAM OF LOWER LEG: CPT | Mod: 26,50

## 2022-04-09 PROCEDURE — 85025 COMPLETE CBC W/AUTO DIFF WBC: CPT

## 2022-04-09 PROCEDURE — 73630 X-RAY EXAM OF FOOT: CPT

## 2022-04-09 PROCEDURE — 96374 THER/PROPH/DIAG INJ IV PUSH: CPT

## 2022-04-09 PROCEDURE — 73610 X-RAY EXAM OF ANKLE: CPT

## 2022-04-09 PROCEDURE — 73700 CT LOWER EXTREMITY W/O DYE: CPT | Mod: 26,50,MA

## 2022-04-09 RX ORDER — KETOROLAC TROMETHAMINE 30 MG/ML
15 SYRINGE (ML) INJECTION ONCE
Refills: 0 | Status: DISCONTINUED | OUTPATIENT
Start: 2022-04-09 | End: 2022-04-09

## 2022-04-09 RX ORDER — ACETAMINOPHEN 500 MG
1000 TABLET ORAL ONCE
Refills: 0 | Status: COMPLETED | OUTPATIENT
Start: 2022-04-09 | End: 2022-04-09

## 2022-04-09 RX ORDER — SODIUM CHLORIDE 9 MG/ML
1000 INJECTION INTRAMUSCULAR; INTRAVENOUS; SUBCUTANEOUS ONCE
Refills: 0 | Status: COMPLETED | OUTPATIENT
Start: 2022-04-09 | End: 2022-04-09

## 2022-04-09 RX ADMIN — SODIUM CHLORIDE 1000 MILLILITER(S): 9 INJECTION INTRAMUSCULAR; INTRAVENOUS; SUBCUTANEOUS at 14:57

## 2022-04-09 RX ADMIN — Medication 15 MILLIGRAM(S): at 14:41

## 2022-04-09 RX ADMIN — Medication 400 MILLIGRAM(S): at 16:18

## 2022-04-09 NOTE — ED PROVIDER NOTE - CLINICAL SUMMARY MEDICAL DECISION MAKING FREE TEXT BOX
Pt playing volleyball and injured both ankles, neurovasc intact, no other injuries, no LOC, R ankle plantarflexed, L ankle inverted. Will need imaging of joint and area above and below. If both ankles injured may need admission so will get basics and swab.

## 2022-04-09 NOTE — ED ADULT NURSE REASSESSMENT NOTE - NS ED NURSE REASSESS COMMENT FT1
pt states she felt hot and dizzy, pt reclined in stretcher. FS completed. given apple juice and 1L NS. will reassess.

## 2022-04-09 NOTE — ED PROVIDER NOTE - NS ED ROS FT
Constitutional: (-) fever (-) vomiting  Eyes/ENT: (-) vision changes, (-) hearing changes  Cardiovascular: (-) chest pain, (-) wheezing  Respiratory: (-) cough, (-) shortness of breath  Gastrointestinal: (-) vomiting, (-) diarrhea, (-) abdominal pain  : (-) dysuria   Musculoskeletal: (-) back pain +ankle pain  Integumentary: (-) rash, (-) edema  Neurological: (-)loc  Allergic/Immunologic: (-) pruritus  Endocrine: No history of thyroid disease

## 2022-04-09 NOTE — ED PROVIDER NOTE - PHYSICAL EXAMINATION
Vitals: I have reviewed the patients vital signs  General: Well dressed, well appearing, no acute distress  HEENT: Atraumatic, normocephalic, airway patent  Eyes: EOMI, tracking appropriately  Neck: no tracheal deviation, no JVD, no pain with movement  Chest/Lungs: no trauma, symmetric chest rise, speaking in complete sentences, no WOB  Heart: skin and extremities well perfused, regular rate and rhythm  Neuro: A+Ox3, CN grossly intact, moving all extremities wnl for injury  MSK: R ankle held in plantarflexion, pain along dorsum of ankle, minimal to malleoli, not able to tolerate everion or inversion. L ankle held in inversion, toelrating minimal movement with repositioning. both neurovascularly intact.   Skin: warm extremities

## 2022-04-09 NOTE — ED ADULT NURSE NOTE - NSIMPLEMENTINTERV_GEN_ALL_ED
Implemented All Fall Risk Interventions:  [place] to call system. Call bell, personal items and telephone within reach. Instruct patient to call for assistance. Room bathroom lighting operational. Non-slip footwear when patient is off stretcher. Physically safe environment: no spills, clutter or unnecessary equipment. Stretcher in lowest position, wheels locked, appropriate side rails in place. Provide visual cue, wrist band, yellow gown, etc. Monitor gait and stability. Monitor for mental status changes and reorient to person, place, and time. Review medications for side effects contributing to fall risk. Reinforce activity limits and safety measures with patient and family.

## 2022-04-09 NOTE — ED PROVIDER NOTE - NSFOLLOWUPCLINICS_GEN_ALL_ED_FT
[facility] Orthopedic [facility]  Orthopedic Surgery  [address], 3rd & 4th [place] [address]  Phone: [phone]  Fax:

## 2022-04-09 NOTE — ED ADULT NURSE NOTE - OBJECTIVE STATEMENT
26y female with hx of abnormal EKG(right axis deviation) bibems for b/l ankle injury. pt is alert and oriented x 4 and speaking coherently. Pt was playing volleyball when she fell injuring both ankles. pt has a deformity to left ankle. + pulses. pt given 10morphine via ems. pt in nad. vss. will reassess.

## 2022-04-09 NOTE — ED PROVIDER NOTE - NSFOLLOWUPINSTRUCTIONS_ED_ALL_ED_FT
You were seen in the ER for an ankle injury. We did not find a fracture. You should keep the leg elevated, use ice, and compression.     You can take TYLENOL/ACETAMINOPHEN up to 4,000mg a day for your symptoms in four divided doses.     You can take MOTRIN/IBUPROFEN up to 2,400mg a day in four divided doses.     Follow up with an orthopedist    Return to the ER if you cannot move or feel your leg, if you notice swelling or redness, if the pain is unable to be tolerated, or if you have any concern you would like evaluated.

## 2022-04-09 NOTE — ED PROVIDER NOTE - ATTENDING CONTRIBUTION TO CARE
Attending MD Celestin: I personally have seen and examined this patient.  Resident note reviewed and agree on plan of care and except where noted.  See below for details.     Seen in [place] [facility], brought in by EMS    26F with PMH/PSH including depression (bipolar vs MDD w psychotic features per EMR Nov 2021, psych admission, hx of suicidal attempt OD at age 14), PCOS, hemorrhoids, s/p appendectomy, on terbinafine presents to the ED with bilateral ankle pain.  EMS gave 10mg Morphine in route.  Reports that she was playing volleyball, jumped and landed on twisted L ankle.  Reports after landing on twisted L ankle, twisted the R ankle.  Reports pain at bilateral ankles.  Denies preceding dizziness, weakness, sensory changes.  Denies LOC, hitting head.  Reports pain with moving ankles.  Denies other physical complaints other than ankles.      Exam:   General: NAD  HENT: head NCAT, airway patent   Chest: symmetric chest rise, no increased work of breathing  MSK: ranging neck freely, +edema to the L ankle, limited ROM bilateral ankles secondary to pain, +2 DPs/PTs bilaterally, no tenderness to palpation at malleoli bilaterally, able to move toes, no breaks in skin  Neuro: moving all extremities spontaneously, sensory grossly intact, no gross neuro deficits  Psych: affect flat, appropriate answers    A/P: 26F with bilateral ankle pain, will obtain XR to eval for bony injury, pain well controlled at this time

## 2022-04-09 NOTE — ED PROVIDER NOTE - PATIENT PORTAL LINK FT
You can access the FollowMyHealth Patient Portal offered by [facility] by registering at the following website: [email or web address]. By joining [employer]’s FollowMyHealth portal, you will also be able to view your health information using other applications (apps) compatible with our system.

## 2022-04-09 NOTE — ED PROVIDER NOTE - PROGRESS NOTE DETAILS
Patrice: no fx noted on XR however patient still with significant tenderness and unable to ambulate despite additional analgesia and wrapping of the extremity. CT ordered Patrice: CT neg, still significant pain, mostly L ankle. Aircast applied. Pt given crutch education, ortho f/u.

## 2022-07-08 NOTE — ED ADULT TRIAGE NOTE - SOURCE OF INFORMATION
From: Claudia Moody  To: Milli Pederson MD  Sent: 7/14/2020 12:45 PM CDT  Subject: After-Visit Question    I forgot to ask about heart scan? I also will be faxing over paperwork for Josephine to be filled out! Also could she write something to [employer] so I can stop my membership! They won't let me stop unless I have one and I don't feel comfortable with COVID going!     Thanks Melani   Patient S Plasty Text: Given the location and shape of the defect, and the orientation of relaxed skin tension lines, an S-plasty was deemed most appropriate for repair.  Using a sterile surgical marker, the appropriate outline of the S-plasty was drawn, incorporating the defect and placing the expected incisions within the relaxed skin tension lines where possible.  The area thus outlined was incised deep to adipose tissue with a #15 scalpel blade.  The skin margins were undermined to an appropriate distance in all directions utilizing iris scissors. The skin flaps were advanced over the defect.  The opposing margins were then approximated with interrupted buried subcutaneous sutures.

## 2022-09-06 NOTE — BH SOCIAL WORK INITIAL PSYCHOSOCIAL EVALUATION - NSHIGHRISKBEH_PSY_ALL_CORE
Post-Care Instructions: I reviewed with the patient in detail post-care instructions. Patient is to wear sunprotection, and avoid picking at any of the treated lesions. Pt may apply Vaseline to crusted or scabbing areas.
Consent: The patient's consent was obtained including but not limited to risks of crusting, scabbing, blistering, scarring, darker or lighter pigmentary change, recurrence, incomplete removal and infection.
Number Of Freeze-Thaw Cycles: 2 freeze-thaw cycles
Total Number Of Aks Treated: 15
Render In Bullet Format When Appropriate: No
Duration Of Freeze Thaw-Cycle (Seconds): 10
Detail Level: Zone
Yes

## 2022-10-19 NOTE — BH INPATIENT PSYCHIATRY ASSESSMENT NOTE - NSHISTORFACTOR_PSY_ALL_CORE
Family History of Suicidal behavior/History of abuse/trauma/History of Impulsivity Minoxidil Pregnancy And Lactation Text: This medication has not been assigned a Pregnancy Risk Category but animal studies failed to show danger with the topical medication. It is unknown if the medication is excreted in breast milk.

## 2022-10-26 ENCOUNTER — EMERGENCY (EMERGENCY)
Facility: HOSPITAL | Age: 27
LOS: 1 days | Discharge: HOME | End: 2022-10-26
Attending: EMERGENCY MEDICINE
Payer: MEDICAID

## 2022-10-26 VITALS
OXYGEN SATURATION: 95 % | HEART RATE: 81 BPM | WEIGHT: 186.29 LBS | TEMPERATURE: 98 F | RESPIRATION RATE: 18 BRPM | HEIGHT: 67 IN | SYSTOLIC BLOOD PRESSURE: 119 MMHG | DIASTOLIC BLOOD PRESSURE: 84 MMHG

## 2022-10-26 PROCEDURE — 99284 EMERGENCY DEPT VISIT MOD MDM: CPT

## 2022-10-27 LAB
ALBUMIN SERPL ELPH-MCNC: 3.9 G/DL — SIGNIFICANT CHANGE UP (ref 3.5–5)
ALP SERPL-CCNC: 125 U/L — HIGH (ref 40–120)
ALT FLD-CCNC: 25 U/L DA — SIGNIFICANT CHANGE UP (ref 10–60)
ANION GAP SERPL CALC-SCNC: 10 MMOL/L — SIGNIFICANT CHANGE UP (ref 5–17)
APPEARANCE UR: CLEAR — SIGNIFICANT CHANGE UP
AST SERPL-CCNC: 13 U/L — SIGNIFICANT CHANGE UP (ref 10–40)
BASOPHILS # BLD AUTO: 0.05 K/UL — SIGNIFICANT CHANGE UP (ref 0–0.2)
BASOPHILS NFR BLD AUTO: 0.5 % — SIGNIFICANT CHANGE UP (ref 0–2)
BILIRUB SERPL-MCNC: 0.2 MG/DL — SIGNIFICANT CHANGE UP (ref 0.2–1.2)
BILIRUB UR-MCNC: NEGATIVE — SIGNIFICANT CHANGE UP
BUN SERPL-MCNC: 17 MG/DL — SIGNIFICANT CHANGE UP (ref 7–18)
CALCIUM SERPL-MCNC: 9.2 MG/DL — SIGNIFICANT CHANGE UP (ref 8.4–10.5)
CHLORIDE SERPL-SCNC: 107 MMOL/L — SIGNIFICANT CHANGE UP (ref 96–108)
CO2 SERPL-SCNC: 23 MMOL/L — SIGNIFICANT CHANGE UP (ref 22–31)
COLOR SPEC: YELLOW — SIGNIFICANT CHANGE UP
CREAT SERPL-MCNC: 0.98 MG/DL — SIGNIFICANT CHANGE UP (ref 0.5–1.3)
DIFF PNL FLD: ABNORMAL
EGFR: 82 ML/MIN/1.73M2 — SIGNIFICANT CHANGE UP
EOSINOPHIL # BLD AUTO: 0.15 K/UL — SIGNIFICANT CHANGE UP (ref 0–0.5)
EOSINOPHIL NFR BLD AUTO: 1.4 % — SIGNIFICANT CHANGE UP (ref 0–6)
GLUCOSE SERPL-MCNC: 101 MG/DL — HIGH (ref 70–99)
GLUCOSE UR QL: NEGATIVE — SIGNIFICANT CHANGE UP
HCG SERPL-ACNC: <1 MIU/ML — SIGNIFICANT CHANGE UP
HCG UR QL: NEGATIVE — SIGNIFICANT CHANGE UP
HCT VFR BLD CALC: 46.9 % — HIGH (ref 34.5–45)
HGB BLD-MCNC: 15.6 G/DL — HIGH (ref 11.5–15.5)
IMM GRANULOCYTES NFR BLD AUTO: 0.2 % — SIGNIFICANT CHANGE UP (ref 0–0.9)
KETONES UR-MCNC: NEGATIVE — SIGNIFICANT CHANGE UP
LEUKOCYTE ESTERASE UR-ACNC: ABNORMAL
LIDOCAIN IGE QN: 186 U/L — SIGNIFICANT CHANGE UP (ref 73–393)
LYMPHOCYTES # BLD AUTO: 37.6 % — SIGNIFICANT CHANGE UP (ref 13–44)
LYMPHOCYTES # BLD AUTO: 4.13 K/UL — HIGH (ref 1–3.3)
MCHC RBC-ENTMCNC: 30.2 PG — SIGNIFICANT CHANGE UP (ref 27–34)
MCHC RBC-ENTMCNC: 33.3 GM/DL — SIGNIFICANT CHANGE UP (ref 32–36)
MCV RBC AUTO: 90.7 FL — SIGNIFICANT CHANGE UP (ref 80–100)
MONOCYTES # BLD AUTO: 0.48 K/UL — SIGNIFICANT CHANGE UP (ref 0–0.9)
MONOCYTES NFR BLD AUTO: 4.4 % — SIGNIFICANT CHANGE UP (ref 2–14)
NEUTROPHILS # BLD AUTO: 6.16 K/UL — SIGNIFICANT CHANGE UP (ref 1.8–7.4)
NEUTROPHILS NFR BLD AUTO: 55.9 % — SIGNIFICANT CHANGE UP (ref 43–77)
NITRITE UR-MCNC: NEGATIVE — SIGNIFICANT CHANGE UP
NRBC # BLD: 0 /100 WBCS — SIGNIFICANT CHANGE UP (ref 0–0)
PH UR: 6 — SIGNIFICANT CHANGE UP (ref 5–8)
PLATELET # BLD AUTO: 281 K/UL — SIGNIFICANT CHANGE UP (ref 150–400)
POTASSIUM SERPL-MCNC: 3.9 MMOL/L — SIGNIFICANT CHANGE UP (ref 3.5–5.3)
POTASSIUM SERPL-SCNC: 3.9 MMOL/L — SIGNIFICANT CHANGE UP (ref 3.5–5.3)
PROT SERPL-MCNC: 7.8 G/DL — SIGNIFICANT CHANGE UP (ref 6–8.3)
PROT UR-MCNC: 30 MG/DL
RBC # BLD: 5.17 M/UL — SIGNIFICANT CHANGE UP (ref 3.8–5.2)
RBC # FLD: 13.4 % — SIGNIFICANT CHANGE UP (ref 10.3–14.5)
SODIUM SERPL-SCNC: 140 MMOL/L — SIGNIFICANT CHANGE UP (ref 135–145)
SP GR SPEC: 1.02 — SIGNIFICANT CHANGE UP (ref 1.01–1.02)
UROBILINOGEN FLD QL: 1
WBC # BLD: 10.99 K/UL — HIGH (ref 3.8–10.5)
WBC # FLD AUTO: 10.99 K/UL — HIGH (ref 3.8–10.5)

## 2022-10-27 PROCEDURE — 83690 ASSAY OF LIPASE: CPT

## 2022-10-27 PROCEDURE — 81001 URINALYSIS AUTO W/SCOPE: CPT

## 2022-10-27 PROCEDURE — 96374 THER/PROPH/DIAG INJ IV PUSH: CPT

## 2022-10-27 PROCEDURE — 36415 COLL VENOUS BLD VENIPUNCTURE: CPT

## 2022-10-27 PROCEDURE — 84702 CHORIONIC GONADOTROPIN TEST: CPT

## 2022-10-27 PROCEDURE — 85025 COMPLETE CBC W/AUTO DIFF WBC: CPT

## 2022-10-27 PROCEDURE — 80053 COMPREHEN METABOLIC PANEL: CPT

## 2022-10-27 PROCEDURE — 81025 URINE PREGNANCY TEST: CPT

## 2022-10-27 PROCEDURE — 99284 EMERGENCY DEPT VISIT MOD MDM: CPT | Mod: 25

## 2022-10-27 PROCEDURE — 87086 URINE CULTURE/COLONY COUNT: CPT

## 2022-10-27 RX ORDER — FAMOTIDINE 10 MG/ML
20 INJECTION INTRAVENOUS ONCE
Refills: 0 | Status: COMPLETED | OUTPATIENT
Start: 2022-10-27 | End: 2022-10-27

## 2022-10-27 RX ORDER — ESOMEPRAZOLE MAGNESIUM 40 MG/1
1 CAPSULE, DELAYED RELEASE ORAL
Qty: 14 | Refills: 0
Start: 2022-10-27 | End: 2022-11-09

## 2022-10-27 RX ORDER — SODIUM CHLORIDE 9 MG/ML
1000 INJECTION INTRAMUSCULAR; INTRAVENOUS; SUBCUTANEOUS ONCE
Refills: 0 | Status: COMPLETED | OUTPATIENT
Start: 2022-10-27 | End: 2022-10-27

## 2022-10-27 RX ORDER — SUCRALFATE 1 G
1 TABLET ORAL ONCE
Refills: 0 | Status: COMPLETED | OUTPATIENT
Start: 2022-10-27 | End: 2022-10-27

## 2022-10-27 RX ORDER — LIDOCAINE 4 G/100G
10 CREAM TOPICAL ONCE
Refills: 0 | Status: COMPLETED | OUTPATIENT
Start: 2022-10-27 | End: 2022-10-27

## 2022-10-27 RX ADMIN — FAMOTIDINE 20 MILLIGRAM(S): 10 INJECTION INTRAVENOUS at 02:35

## 2022-10-27 RX ADMIN — LIDOCAINE 10 MILLILITER(S): 4 CREAM TOPICAL at 02:34

## 2022-10-27 RX ADMIN — Medication 30 MILLILITER(S): at 02:34

## 2022-10-27 RX ADMIN — SODIUM CHLORIDE 1000 MILLILITER(S): 9 INJECTION INTRAMUSCULAR; INTRAVENOUS; SUBCUTANEOUS at 02:34

## 2022-10-27 RX ADMIN — Medication 1 GRAM(S): at 02:35

## 2022-10-27 NOTE — ED ADULT NURSE NOTE - MODE OF DISCHARGE
Protocol For Photochemotherapy For Severe Photoresponsive Dermatoses: Tar And Broad Band Uvb (Goeckerman Treatment): The patient received Photochemotherapy for severe photoresponsive dermatoses: Tar and Broad Band UVB (Goeckerman treatment) requiring at least 4 to 8 hours of care under direct physician supervision. Protocol For Photochemotherapy: Mineral Oil And Nbuvb: The patient received Photochemotherapy: Mineral Oil and NBUVB (mineral oil applied to all lesions prior to phototherapy). Protocol For Photochemotherapy: Tar And Nbuvb (Goeckerman Treatment): The patient received Photochemotherapy: Tar and NBUVB (Goeckerman treatment). Protocol For Photochemotherapy: Baby Oil And Nbuvb: The patient received Photochemotherapy: Baby Oil and NBUVB (baby oil applied to all lesions prior to phototherapy). Protocol For Photochemotherapy For Severe Photoresponsive Dermatoses: Petrolatum And Nbuvb: The patient received Photochemotherapy for severe photoresponsive dermatoses: Petrolatum and NBUVB requiring at least 4 to 8 hours of care under direct physician supervision. Protocol For Photochemotherapy: Triamcinolone Ointment And Nbuvb: The patient received Photochemotherapy: Triamcinolone and NBUVB (triamcinolone ointment applied to all lesions prior to phototherapy). Protocol For Photochemotherapy: Petrolatum And Broad Band Uvb: The patient received Photochemotherapy: Petrolatum and Broad Band UVB. Total Body Energy: 419 Changes In Treatment Protocol: start 350 mj increase 50 mj, holding dose is 1,000 mJ Protocol For Nb Uva: The patient received NB UVA. Protocol For Photochemotherapy For Severe Photoresponsive Dermatoses: Puva: The patient received Photochemotherapy for severe photoresponsive dermatoses: PUVA requiring at least 4 to 8 hours of care under direct physician supervision. Protocol: NBUVB Consent: Written consent obtained.  The risks were reviewed with the patient including but not limited to: burn, pigmentary changes, pain, blistering, scabbing, redness, increased risk of skin cancers, and the remote possibility of scarring. Total Body Time: 4:48 Protocol For Photochemotherapy: Tar And Broad Band Uvb (Goeckerman Treatment): The patient received Photochemotherapy: Tar and Broad Band UVB (Goeckerman treatment). Protocol For Broad Band Uvb: The patient received Broad Band UVB. Protocol For Photochemotherapy: Petrolatum And Nbuvb: The patient received Photochemotherapy: Petrolatum and NBUVB (petrolatum applied to all lesions prior to phototherapy). Protocol For Photochemotherapy For Severe Photoresponsive Dermatoses: Tar And Nbuvb (Goeckerman Treatment): The patient received Photochemotherapy for severe photoresponsive dermatoses: Tar and NBUVB (Goeckerman treatment) requiring at least 4 to 8 hours of care under direct physician supervision. Protocol For Nbuvb: The patient received NBUVB. Protocol For Photochemotherapy For Severe Photoresponsive Dermatoses: Petrolatum And Broad Band Uvb: The patient received Photochemotherapyfor severe photoresponsive dermatoses: Petrolatum and Broad Band UVB requiring at least 4 to 8 hours of care under direct physician supervision. Skin Type: IV Protocol For Uva1: The patient received UVA1. Protocol For Uva: The patient received UVA. Render Post-Care In The Note: no Protocol For Bath Puva: The patient received Bath PUVA. Name Of Supervising Technician: ./hanna Protocol For Photochemotherapy: Mineral Oil And Broad Band Uvb: The patient received Photochemotherapy: Mineral Oil and Broad Band UVB. Detail Level: Zone Post-Care Instructions: I reviewed with the patient in detail post-care instructions. Patient is to wear sun protection. Patients may expect sunburn like redness, discomfort and scabbing. Protocol For Puva: The patient received PUVA. Protocol For Protocol For Photochemotherapy For Severe Photoresponsive Dermatoses: Bath Puva: The patient received Photochemotherapy for severe photoresponsive dermatoses: Bath PUVA requiring at least 4 to 8 hours of care under direct physician supervision. Ambulatory

## 2022-10-27 NOTE — ED PROVIDER NOTE - NSFOLLOWUPINSTRUCTIONS_ED_ALL_ED_FT
[facility] Chinese[employer]                                                                                                                                  Gastritis, Adult    Outline of an adult's lower body with a close-up of the stomach, showing inflammation and an ulcer inside the stomach.    Gastritis is inflammation of the stomach. There are two kinds of gastritis:  •Acute gastritis. This kind develops suddenly.      •Chronic gastritis. This kind is much more common. It develops slowly and lasts for a long time.      Gastritis happens when the lining of the stomach becomes weak or gets damaged. Without treatment, gastritis can lead to stomach bleeding and ulcers.      What are the causes?    This condition may be caused by:  •An infection.      •Drinking too much alcohol.      •Certain medicines. These include steroids, antibiotics, and some over-the-counter medicines, such as aspirin or ibuprofen.      •Having too much acid in the stomach.      •Having a disease of the stomach.      Other causes may include:  •An allergic reaction.      •Some cancer treatments (radiation).      •Smoking cigarettes or the use of products that contain nicotine or tobacco.      In some cases, the cause of this condition is not known.      What increases the risk?    •Having a disease of the intestines.      •Having a disease in which the body's immune system attacks the body (autoimmune disease), such as Crohn's disease.      •Using aspirin or ibuprofen and other NSAIDs to treat other conditions, such as heart disease or chronic pain.      •Stress.        What are the signs or symptoms?    Symptoms of this condition include:  •Pain or a burning sensation in the upper abdomen.      •Nausea.      •Vomiting.      •An uncomfortable feeling of fullness after eating.      •Weight loss.      •Bad breath.      •Blood in your vomit or stool (feces).      In some cases, there are no symptoms.      How is this diagnosed?    This condition may be diagnosed based on your medical history, a physical exam, and tests. Tests may include:  •Your medical history and a description of your symptoms.      •A physical exam.    •Tests. These can include:  •Blood tests.      •Stool tests.      •A test in which a thin, flexible instrument with a light and a camera is passed down the esophagus and into the stomach (upper endoscopy).      •A test in which a tissue sample is removed to look at it under a microscope (biopsy).          How is this treated?    This condition may be treated with medicines. The medicines that are used vary depending on the cause of the gastritis.  •If the condition is caused by a bacterial infection, you may be given antibiotic medicines.      •If the condition is caused by too much acid in the stomach, you may be given medicines called H2 blockers, proton pump inhibitors, or antacids.      Treatment may also involve stopping the use of certain medicines such as aspirin or ibuprofen and other NSAIDs.      Follow these instructions at home:    Medicines     •Take over-the-counter and prescription medicines only as told by your health care provider.      •If you were prescribed an antibiotic medicine, take it as told by your health care provider. Do not stop taking the antibiotic even if you start to feel better.      Alcohol use   • Do not drink alcohol if:  •Your health care provider tells you not to drink.      •You are pregnant, may be pregnant, or are planning to become pregnant.      •If you drink alcohol:•Limit your use to:  •0–1 drink a day for women.      •0–2 drinks a day for men.        •Know how much alcohol is in your drink. In the [place] one drink equals one 12 oz bottle of beer (355 mL), one 5 oz glass of wine (148 mL), or one 1½ oz glass of hard liquor (44 mL).          General instructions   A comparison of three sample cups showing dark yellow, yellow, and pale yellow urine.   •Eat small, frequent meals instead of large meals.      •Avoid foods and drinks that make your symptoms worse.      •Talk with your health care provider about ways to manage stress, such as getting regular exercise or practicing deep breathing, meditation, or yoga.      • Do not use any products that contain nicotine or tobacco. These products include cigarettes, chewing tobacco, and vaping devices, such as e-cigarettes. If you need help quitting, ask your health care provider.      •Drink enough fluid to keep your urine pale yellow.      •Keep all follow-up visits. This is important.        Contact a health care provider if:    •Your symptoms get worse.      •Your abdominal pain gets worse.      •Your symptoms return after treatment.      •You have a fever.        Get help right away if:    •You vomit blood or a substance that looks like coffee grounds.      •You have black or dark red stools.      •You are unable to keep fluids down.      These symptoms may represent a serious problem that is an emergency. Do not wait to see if the symptoms will go away. Get medical help right away. Call your local emergency services (911 in the [place] Do not drive yourself to the hospital.       Summary    •Gastritis is inflammation of the lining of the stomach that can occur suddenly (acute) or develop slowly over time (chronic).      •This condition is diagnosed with a medical history, a physical exam, or tests.      •This condition may be treated with medicines to treat infection or medicines to reduce the amount of acid in your stomach.      •Follow your health care provider's instructions about taking medicines, making changes to your diet, and knowing when to call for help.      This information is not intended to replace advice given to you by your health care provider. Make sure you discuss any questions you have with your health care provider.      Document Revised: 04/23/2022 Document Reviewed: 04/23/2022    Elsevier Patient Education © 2022 [employer]

## 2022-10-27 NOTE — ED ADULT NURSE NOTE - HAVE YOU RECEIVED AT LEAST TWO PFIZER AND/OR MODERNA VACCINATIONS (IN ANY COMBINATION) AND/OR ONE JOHNSON & JOHNSON VACCINATION?
C/o epigastric pain radiates to under rib cage and back-described as if someone is kicking her back x 2 days. +nausea. No vomiting. No sweating.
Yes

## 2022-10-27 NOTE — ED PROVIDER NOTE - PROGRESS NOTE DETAILS
abd pain resolved with GI cocktail. labs are unremarkable. likely gastritis. will dc with GI meds. f/u with PMD. return precautions discussed.

## 2022-10-27 NOTE — ED PROVIDER NOTE - PATIENT PORTAL LINK FT
You can access the FollowMyHealth Patient Portal offered by [facility] by registering at the following website: [email or web address]. By joining [employer]’s FollowMyHealth portal, you will also be able to view your health information using other applications (apps) compatible with our system.

## 2022-10-27 NOTE — ED PROVIDER NOTE - OBJECTIVE STATEMENT
27 year old female PMH anxiety/depression coming in with 2 weeks of epigastric abd pain radiating to her back worsened with PO intake with mild associated nausea. also states for the past 3 weeks with intermittent migraine-like headaches. denies all other complaints.

## 2022-10-29 LAB
CULTURE RESULTS: SIGNIFICANT CHANGE UP
SPECIMEN SOURCE: SIGNIFICANT CHANGE UP

## 2022-12-10 ENCOUNTER — EMERGENCY (EMERGENCY)
Facility: HOSPITAL | Age: 27
LOS: 1 days | Discharge: HOME | End: 2022-12-10
Attending: STUDENT IN AN ORGANIZED HEALTH CARE EDUCATION/TRAINING PROGRAM
Payer: MEDICAID

## 2022-12-10 PROCEDURE — 99284 EMERGENCY DEPT VISIT MOD MDM: CPT

## 2022-12-11 VITALS
OXYGEN SATURATION: 97 % | DIASTOLIC BLOOD PRESSURE: 80 MMHG | HEART RATE: 89 BPM | SYSTOLIC BLOOD PRESSURE: 122 MMHG | RESPIRATION RATE: 18 BRPM | TEMPERATURE: 98 F

## 2022-12-11 VITALS
SYSTOLIC BLOOD PRESSURE: 125 MMHG | DIASTOLIC BLOOD PRESSURE: 82 MMHG | HEART RATE: 92 BPM | WEIGHT: 190.04 LBS | RESPIRATION RATE: 16 BRPM | TEMPERATURE: 98 F | HEIGHT: 67 IN | OXYGEN SATURATION: 98 %

## 2022-12-11 LAB
APPEARANCE UR: CLEAR — SIGNIFICANT CHANGE UP
BACTERIA # UR AUTO: ABNORMAL /HPF
BILIRUB UR-MCNC: NEGATIVE — SIGNIFICANT CHANGE UP
COLOR SPEC: YELLOW — SIGNIFICANT CHANGE UP
COMMENT - URINE: SIGNIFICANT CHANGE UP
DIFF PNL FLD: ABNORMAL
EPI CELLS # UR: SIGNIFICANT CHANGE UP /HPF
GLUCOSE UR QL: NEGATIVE — SIGNIFICANT CHANGE UP
HYALINE CASTS # UR AUTO: ABNORMAL /LPF
KETONES UR-MCNC: ABNORMAL
LEUKOCYTE ESTERASE UR-ACNC: ABNORMAL
NITRITE UR-MCNC: NEGATIVE — SIGNIFICANT CHANGE UP
PH UR: 7 — SIGNIFICANT CHANGE UP (ref 5–8)
PROT UR-MCNC: 30 MG/DL
RBC CASTS # UR COMP ASSIST: ABNORMAL /HPF (ref 0–2)
SP GR SPEC: 1.01 — SIGNIFICANT CHANGE UP (ref 1.01–1.02)
UROBILINOGEN FLD QL: 1
WBC UR QL: ABNORMAL /HPF (ref 0–5)

## 2022-12-11 PROCEDURE — 87086 URINE CULTURE/COLONY COUNT: CPT

## 2022-12-11 PROCEDURE — 81001 URINALYSIS AUTO W/SCOPE: CPT

## 2022-12-11 PROCEDURE — 99283 EMERGENCY DEPT VISIT LOW MDM: CPT

## 2022-12-11 RX ORDER — CEFPODOXIME PROXETIL 100 MG
1 TABLET ORAL
Qty: 20 | Refills: 0
Start: 2022-12-11 | End: 2022-12-20

## 2022-12-11 NOTE — ED PROVIDER NOTE - CLINICAL SUMMARY MEDICAL DECISION MAKING FREE TEXT BOX
27-year-old female no medical hx presenting with dysuria x few days. UA likley hemorrhagic cystitis, will treat with antibiotics, patient appears well and can be discharged, strict return precautions provided.

## 2022-12-11 NOTE — ED PROVIDER NOTE - OBJECTIVE STATEMENT
27-year-old female no medical hx presenting with dysuria x few days. Has suprapubic pain, some L flank pain. Has had vaginal bleeding for a month which her gynecologist has worked up and attributes to OCPs, unchanged today from prior. Notes some blood in her urine. Notes she previously had a UTI for which she was admitted for IV abx for 2 weeks. No fevers or chills. No other symptoms.

## 2022-12-11 NOTE — ED PROVIDER NOTE - PATIENT PORTAL LINK FT
You can access the FollowMyHealth Patient Portal offered by [facility] by registering at the following website: [email or web address]. By joining [employer]’s FollowMyHealth portal, you will also be able to view your health information using other applications (apps) compatible with our system.

## 2022-12-11 NOTE — ED ADULT TRIAGE NOTE - HAVE YOU HAD COVID IN THE LAST 60 DAYS?
Neurology Initial Note    REQUESTING PROVIDER: Dr. Analia Ray    REASON FOR CONSULT: Syncope    CHIEF COMPLAINT:    Chief Complaint   Patient presents with   • Syncope   .    HISTORY OF PRESENT ILLNESS:    I am seeing Herbie Kemp at the request of Dr. Analia Ray for inpatient neurology consultation for syncope . Herbie Kemp is a pleasant 23 year old  gentleman who was admitted to the hospital on May 12 for evaluation abnormal psychotic behaviors, possible seizure activity which include this was of eye rolling.  Per documentation his eye really seem to be intentional.  Family denied any generalized seizure activity.  In the emergency department he had a syncopal episode where he slid out of the chair.  CT of head was negative for acute intracranial findings.  Chest x-ray revealed pneumomediastinum with subcutaneous edema, CT of chest revealed pneumomediastinum with subcutaneous emphysema at the neck and posteriorly into the spinal canal at C7 through T2 levels.  He was admitted to the intensive care unit further workup and evaluation.    He has no past medical history.  His level of independent of unknown at the time of exam.  UA was positive for THC.    PAST MEDICAL HISTORY:    There is no previous medical history on file.    No past surgical history on file.    Social History     Socioeconomic History   • Marital status: Single     Spouse name: Not on file   • Number of children: Not on file   • Years of education: Not on file   • Highest education level: Not on file   Occupational History   • Not on file   Tobacco Use   • Smoking status: Not on file   • Smokeless tobacco: Not on file   Substance and Sexual Activity   • Alcohol use: Not on file   • Drug use: Not on file   • Sexual activity: Not on file   Other Topics Concern   • Not on file   Social History Narrative   • Not on file     Social Determinants of Health     Financial Resource Strain: Not on file   Food Insecurity: Not on  file   Transportation Needs: Not on file   Physical Activity: Not on file   Stress: Not on file   Social Connections: Not on file   Intimate Partner Violence: Not on file       No family history on file.    ALLERGIES:  No Known Allergies    No medications prior to admission.       Current Facility-Administered Medications   Medication Dose Route Frequency Provider Last Rate Last Admin   • piperacillin-tazobactam (ZOSYN) 3.375 g in sodium chloride 0.9 % 100 mL IVPB  3.375 g Intravenous 3 times per day Jose L Allen MD 25 mL/hr at 05/12/22 1357 3.375 g at 05/12/22 1357   • sodium chloride 0.9 % flush bag 25 mL  25 mL Intravenous PRN Jose L Allen MD       • sodium chloride (PF) 0.9 % injection 2 mL  2 mL Intracatheter 2 times per day Jose L Allen MD   2 mL at 05/12/22 0803   • sodium chloride 0.9% infusion   Intravenous Continuous PRN Jose L Allen MD       • sodium chloride 0.9% infusion   Intravenous Continuous PRN Jose L Allen MD 10 mL/hr at 05/12/22 0423 New Bag at 05/12/22 0423   • sodium chloride 0.9 % flush bag 25 mL  25 mL Intravenous PRN Jose L Allen MD       • [Held by provider] enoxaparin (LOVENOX) injection 40 mg  40 mg Subcutaneous Daily Jose L Allen MD   40 mg at 05/12/22 0802   • ondansetron (ZOFRAN ODT) disintegrating tablet 4 mg  4 mg Oral Q12H PRN Jose L Allen MD        Or   • ondansetron (ZOFRAN) injection 4 mg  4 mg Intravenous Q12H PRN Jose L Allen MD       • albuterol (VENTOLIN) nebulizer 2.5 mg  2.5 mg Nebulization Q4H Resp PRN Jose L Allen MD       • dextrose 5 % / lactated ringers infusion   Intravenous Continuous Jose L Allen MD 75 mL/hr at 05/12/22 0935 New Bag at 05/12/22 0935   • Phosphorus Standard Replacement Protocol   Does not apply See Admin Instructions Jose L Allen MD       • Magnesium Standard Replacement Protocol   Does not apply See Admin Instructions Jose L Allen MD       • Potassium Standard Replacement Protocol   Does not apply  See Admin Instructions Jose L Allen MD       • EPINEPHRINE HCL 0.1 MG/ML IJ SOSY(PF AND NON PF)(WRAPPED) Pyxis Override            • LORazepam (ATIVAN) injection 2 mg  2 mg Intravenous Q4H PRN Jose L Allen MD           GENERAL REVIEW OF SYSTEM:  Unable to obtain review of systems    PHYSICAL EXAM:    Blood pressure (!) 154/77, pulse (!) 136, temperature 97.9 °F (36.6 °C), temperature source Axillary, resp. rate (!) 23, height 6' 1\" (1.854 m), weight 74.5 kg (164 lb 3.9 oz), SpO2 99 %..   Constitutional: The patient is well-nourished and well developed, in no acute distress.  HHENT: Normocephalic and atraumatic.  Neck: Supple, no JVD or carotid bruit.  Heart: Regular rate and rhythm, S1 and S2 heard, no murmur  Lungs: Clear to auscultation.    NEUROLOGICAL EXAMINATION:    Mental Status:  He is obtunded, briefly opens his eyes to verbal stimuli and the prospect of sleep..  He does not follow any commands.   Cranial Nerves:  Pupils were round and sluggish to react to light, left pupil 2 mm, right pupil 2.5 mm.  He had rolling eye movements.  Fundi, Visual fields, facial sensations, hearing, tongue, palate could not be assessed.  Face appeared symmetric.  Motor:  He did not cooperate for detailed motor strength examination.  He was moving all 4 extremities spontaneously in the bed  Deep tendon reflexes were symmetric. Plantars withdrew  Sensory:  Pinprick, temperature, vibration and position sensations could not be assessed  Coordination: Finger to nose and heel to shin, Rapid alternating movements,  Ro\[place] could not be performed  Gait: Station and gait were not assessed    LABORATORY RESULTS:    Lab Results   Component Value Date    SODIUM 132 (L) 05/12/2022    POTASSIUM 3.6 05/12/2022    GLUCOSE 82 05/12/2022    BUN 11 05/12/2022    CREATININE 0.99 05/12/2022    CALCIUM 8.9 05/12/2022    MG 1.7 05/11/2022    BILIRUBIN 1.2 (H) 05/11/2022    AST 24 05/11/2022    GPT 22 05/11/2022    ALBUMIN 4.7 05/11/2022     RBC 5.53 05/12/2022    WBC 10.7 05/12/2022    HGB 13.2 05/12/2022    HCT 40.7 05/12/2022     05/12/2022       US CAROTID BILATERAL  Narrative: BILATERAL CAROTID DUPLEX ULTRASOUND    CLINICAL INFORMATION:  syncope    COMPARISON:  No prior ultrasound comparison.    TECHNIQUE:  Real-time duplex color/spectral ultrasound of the carotid and  vertebral extracranial circulation was performed by the sonographic  technologist.        % stenosis       PSV (cm/s)    EDV (cm/s)   PS ratio       <50                         <125       < 40       <2.0      50-69            125-230          40 -100  2.0 - 4.0      70-95      >230      >100                <4.0      Near Occlusion  Variable          Variable          Variable    Validated velocity criteria, extrapolated from diameter data as defined by  the Society of Radiologists in Ultrasound Consensus Conference Radiology  2003; 229; 340 - 346.        FINDINGS:     RIGHT EXTRACRANIAL CIRCULATION:       Doppler velocities:  CCA PSV:  128.6 cm/sec  ICA PSV:   122.6 cm/sec  ICA EDV:   24.1 cm/sec  ECA PSV:  97.8 cm/sec  PSV I/C:     1.0    Right common carotid:  Mild distal intimal hyperplasia and scattered  atheromatous plaque without stenosis.    Right internal carotid:  Mild atheromatous and calcific plaque formation  involving the bulb and proximal ICA, without significant stenosis by  velocity criteria above.  Right external carotid:  No evidence of significant stenosis.    Right Vertebral Artery:  Patent with antegrade flow.    LEFT EXTRACRANIAL CIRCULATION:      Doppler velocities:  CCA PSV:  115.6 cm/sec  Bulb PSV:   133.8 cm/sec  Bulb EDV:   21.5 cm/sec  ECA PSV:  112.9 cm/sec  PSV I/C:     1.2    Left common carotid:  Mild distal intimal hyperplasia and scattered  atheromatous plaque without stenosis.    Left internal carotid:  Moderate atheromatous and calcific plaque formation  involving the bulb and proximal ICA, resulting in a 50-69% stenosis by  velocity  criteria above.  Left external carotid:  No evidence of significant stenosis.    Left Vertebral Artery:  Patent with antegrade flow.  Impression: IMPRESSION:      1.  50-69% stenosis within the left carotid bulb.  2.  No evidence of hemodynamically significant right carotid stenosis.  3.  Bilateral antegrade vertebral artery flow.  CT HEAD WO CONTRAST  Narrative: EXAM:  CT HEAD WO CONTRAST    CLINICAL INDICATION:  23 years-old Male, history of Mental status change,  unknown cause    COMPARISON:  None available.  CONTRAST:  None.  TECHNIQUE:  Routine head CT spanning cranial vertex through foramen magnum.  Coronal and sagittal reformats.    FINDINGS:      No acute intracranial hemorrhage, hydrocephalus, or mass effect/  herniation.  No CT findings of acute ischemic infarct, although MRI is more sensitive  for detection of acute ischemia.  Size and configuration of the ventricles and sulci are considered within  normal limits for age.    Gray-white matter differentiation is preserved.  Moderate polypoid mucosal thickening/mucous retention cyst within the  maxillary sinuses bilaterally, and milder mucosal thickening in the ethmoid  sinus.  Hypoplastic right frontal sinus.  Mastoid air cells are clear.  Moderately extensive subcutaneous emphysema within the retropharyngeal  space and extending to the skull base, reasonably tracking superiorly from  known pneumomediastinum.  No evidence of displaced calvarial/skull base  fracture.    __________  Impression: IMPRESSION:    *  No acute intracranial findings.  *  Retropharyngeal soft tissue gas, likely tracking superiorly from known  pneumomediastinum.    //Location Code: ASDT  CT CHEST WO CONTRAST  Narrative: CT OF THE THORAX     INDICATION:  Pain, pneumomediastinum    COMPARISON:  None.    TECHNIQUE:  CT of the thorax was performed without IV contrast.    FINDINGS:    The heart size is within normal limits.     Moderate pneumomediastinum is noted.     Subcutaneous  emphysema is noted at the visualized neck base with  extravasation posteriorly and into the spinal canal at the C7-T2 levels.  There is no definite extravasation of oral contrast.    There is no focal consolidation or effusion.  Impression: IMPRESSION:  1.  Moderate pneumomediastinum. Subcutaneous emphysema is noted at the  visualized neck base with extravasation posteriorly and into the spinal  canal at the C7-T2 levels. There is no definite extravasation of oral  contrast. Tracheobronchial an esophageal etiologies should be considered.  2. No focal consolidation or effusion.    ASSESSMENT AND PLAN:    1. Altered mental status   2. Episode of syncope versus seizure like activity   3. Marijuana use disorder   4. Tachycardia  5. Pneumomediastinum    Notes, l labs, events reviewed.  The patient is obtunded time of exam and is unable to offer any history.  Per chart review and the patient's family noted some abnormal behavior at home and possible seizure activity which included eye rolling.  In the emergency department he apparently had a syncopal episode when he fell or slid out of a chair.  CT of his head was negative for acute intracranial findings.  He did receive 1 mg of IV Ativan this morning when he was becoming restless.  EEG has already been ordered to further characterize his mental status and possible episode of syncope versus seizure activity, await interpretation.  Will also get MRI of brain to further characterize.  For now recommend continuing ICU cares, seizure and fall precautions.  Will continue to follow along.    Thank you for the opportunity for participating in the care of this pleasant gentleman.    Case, exam findings and plan of care was discussed with Dr. Shaik Onofre.    Jessica Quintana, APNP    ==============================    Neurology attending note:    I reviewed the chart and noted the events.  I staffed and rounded with my nurse practitioner Jessica Quintana and I agree with her  documentation of subjective symptoms, medications, allergies, social history, family history, past medical/surgical history.  I did the pertinent physical including a limited neurological assessment.  I agree with my nurse practitioner's documentation of his/her physical exam.  I formulated the treatment plan with my nurse practitioner and I agree with her recommendations.  EEG is awaited.  CT scan of the head did not show any acute intracranial findings.  If he continues to have mental status changes then I would get an MRI of the brain.  I will continue to follow along.    Thank you,     Shaik Kingston MD     No

## 2022-12-11 NOTE — ED PROVIDER NOTE - NSFOLLOWUPINSTRUCTIONS_ED_ALL_ED_FT
You were seen in the emergency department for: problems urinating  Your diagnosis for this visit was: urinary tract infection  From this ED visit you were prescribed: cefpodoxime  We recommend you follow up with: your primary care doctor    Please return to the Emergency Department if you experience any of the following symptoms:   - Shortness of breath or trouble breathing  - Pressure, pain or tightness in the chest  - Face drooping, arm weakness or speech difficulty  - Persistence of severe vomiting  - Head injury or loss of consciousness  - Nonstop bleeding or an open wound    (1) Follow up with your primary care physician within the next 24-48 hours as discussed. In addition, we did not find evidence of a life threatening illness on your testing here today, but listed below are the specialists that will be necessary to see as an outpatient to continue the workup.  Please call the numbers listed below or [phone] to set up the necessary appointments.  (2) Take Tylenol (up to 1000mg or 1 g)  and/or Motrin (up to 600mg) up to every 6 hours as needed for pain.   (3) If you had an IV (intravenous) line placed, it was removed. Sometimes, after IV removal, that area can be tender for a few days; if it develops redness and swelling, those could be signs of infection; in which case, return to the Emergency Department for assessment.  (4) Please continue taking all of your home medications as directed.

## 2022-12-12 LAB
CULTURE RESULTS: SIGNIFICANT CHANGE UP
SPECIMEN SOURCE: SIGNIFICANT CHANGE UP

## 2022-12-23 NOTE — BH PATIENT PROFILE - NSDYSPHAGRISKASSESS_PSY_ALL_CORE
Called patient to notify the office is closing @ noon due to bad weather could not leave voicemail, mailbox full please advise
Yes, patient not at risk

## 2023-01-10 NOTE — ED ADULT NURSE NOTE - NSFALLRSKASSESSTYPE_ED_ALL_ED
· Recommended and counseled on the importance of influenza and COVID vaccination. Patient's mother declined today.   · Otherwise UTD with vaccinations, will recommend HPV vaccination at next annual visit  · Reviewed growth chart with mother, appropriate.   · Doing well in school. Discussed anticipatory guidance. Encouraged good dental care, good nutrition, physical activity, sleep and screen time habits  · Follow up in 1 year for annual physical.          Initial (On Arrival)

## 2023-03-09 PROBLEM — Z00.00 ENCOUNTER FOR PREVENTIVE HEALTH EXAMINATION: Status: ACTIVE | Noted: 2023-03-09

## 2023-03-13 NOTE — ED PROVIDER NOTE - OBJECTIVE STATEMENT
Called patient for further evaluation. No answer, left voicemail requesting call back and may ask to speak with writer.     Due to c/o wheezing with medical h/o COPD - does the patient see pulmonology provider?    Any recent blood pressure or heart rate readings?    Any worsening VILLA or Edema over the weekend?    Is he taking amlodipine 15 mg daily? DID the new edema start once increasing the amlodipine dose from PCP?       
Component      Latest Ref Rng & Units 3/11/2023   Fasting Status      0 - 999 Hours 0   Sodium      135 - 145 mmol/L 147 (H)   Potassium      3.4 - 5.1 mmol/L 4.6   Chloride      97 - 110 mmol/L 108   CO2      21 - 32 mmol/L 31   ANION GAP      7 - 19 mmol/L 13   Glucose      70 - 99 mg/dL 100 (H)   BUN      6 - 20 mg/dL 16   Creatinine      0.67 - 1.17 mg/dL 1.11   Glomerular Filtration Rate      >=60 65   BUN/CREATININE RATIO      7 - 25 14   CALCIUM      8.4 - 10.2 mg/dL 8.7   TOTAL BILIRUBIN      0.2 - 1.0 mg/dL 0.3   AST/SGOT      <=37 Units/L 12   ALT/SGPT      <64 Units/L 18   ALK PHOSPHATASE      45 - 117 Units/L 100   Albumin      3.6 - 5.1 g/dL 3.5 (L)   TOTAL PROTEIN      6.4 - 8.2 g/dL 6.4   GLOBULIN      2.0 - 4.0 g/dL 2.9   A/G Ratio, Serum      1.0 - 2.4 1.2   WBC      4.2 - 11.0 K/mcL 6.8   RBC      4.50 - 5.90 mil/mcL 5.04   HGB      13.0 - 17.0 g/dL 14.2   HCT      39.0 - 51.0 % 43.8   MCV      78.0 - 100.0 fl 86.9   MCH      26.0 - 34.0 pg 28.2   MCHC      32.0 - 36.5 g/dL 32.4   PLT      140 - 450 K/mcL 138 (L)   RDW-CV      11.0 - 15.0 % 15.3 (H)   RDW-SD      39.0 - 50.0 fL 49.5   NT proBNP      <=450 pg/mL 503 (H)     Will review with oncall Cardiologist  
Patient calling back to discuss.     Due to c/o wheezing with medical h/o COPD - does the patient see pulmonology provider? - the patient does not follow pulmonology, currently reporting using nebs and inhalers.     Any recent blood pressure or heart rate readings? Avg -180s (examples 181/92, 184/102). Patient unable to recall heart rates, writer reviewed recent OV notes, (avg 70-80s).     Any worsening VILLA or Edema over the weekend? Denies worsening. Patient reports VILLA, \"noisy breathing at night when laying down\" patient reports some edema above sock right when taking socks off which he reports he has had for a long time    Is he taking amlodipine 15 mg daily? DID the new edema start once increasing the amlodipine dose from PCP?   Patient reports not starting increased dose yet, has not picked up the prescription yet. Taking 10 mg daily.     Educated patient on slight increase in sodium to avoid adding sodium and monitoring sodium intake. Patient continues to smoke. advised smoking cessation. Informed patient will discuss with oncall cardiologist and call back with recommendations.   
Patient returned call to clinic. Writer RN conveyed below notes. Patient verbalized understanding and agrees with plan of care.     
Reviewed with oncall cardiologist, patient to start furosemide 20 mg daily, low sodium diet. Start potassium 20meq daily also. Call in one week with symptoms. Increase metoprolol succinate to 50 daily. Check BP and HR at home and provide the office with one week log (1.5-2 hour after taking medications).     Called patient to convey recommendations. No answer, left voicemail requesting call back.         
22 y/o F with PMHx of PCOS [place] ED [place]. Pt states she inverted her left ankle and fell because her dog dragged her. Pt adds she has chronic nausea and feels nauseous currently but also states this is her nausea at baseline. Pt denies vomiting, shortness of breath, chest pain, abdominal pain, head trauma, or any other complaints. NKDA.

## 2023-03-15 ENCOUNTER — APPOINTMENT (OUTPATIENT)
Dept: GASTROENTEROLOGY | Facility: CLINIC | Age: 28
End: 2023-03-15
Payer: MEDICAID

## 2023-03-15 VITALS
HEART RATE: 78 BPM | RESPIRATION RATE: 17 BRPM | HEIGHT: 66 IN | WEIGHT: 200 LBS | BODY MASS INDEX: 32.14 KG/M2 | TEMPERATURE: 97.9 F | OXYGEN SATURATION: 99 % | SYSTOLIC BLOOD PRESSURE: 144 MMHG | DIASTOLIC BLOOD PRESSURE: 77 MMHG

## 2023-03-15 DIAGNOSIS — R14.0 ABDOMINAL DISTENSION (GASEOUS): ICD-10-CM

## 2023-03-15 DIAGNOSIS — R14.1 GAS PAIN: ICD-10-CM

## 2023-03-15 DIAGNOSIS — K21.9 GASTRO-ESOPHAGEAL REFLUX DISEASE W/OUT ESOPHAGITIS: ICD-10-CM

## 2023-03-15 PROCEDURE — 99204 OFFICE O/P NEW MOD 45 MIN: CPT

## 2023-03-15 NOTE — PHYSICAL EXAM
[Alert] : alert [Normal Voice/Communication] : normal voice/communication [Healthy Appearing] : healthy appearing [No Acute Distress] : no acute distress [Sclera] : the sclera and conjunctiva were normal [Hearing Threshold Finger Rub Not [place]] : hearing was normal [Normal Lips/Gums] : the lips and gums were normal [Oropharynx] : the oropharynx was normal [Normal Appearance] : the appearance of the neck was normal [No Neck Mass] : no neck mass was observed [No Respiratory Distress] : no respiratory distress [No Acc Muscle Use] : no accessory muscle use [Respiration, Rhythm And Depth] : normal respiratory rhythm and effort [Heart Rate And Rhythm] : heart rate was normal and rhythm regular [Auscultation Breath Sounds / Voice Sounds] : lungs were clear to auscultation bilaterally [Normal S1, S2] : normal S1 and S2 [Murmurs] : no murmurs [Bowel Sounds] : normal bowel sounds [Abdomen Tenderness] : non-tender [No Masses] : no abdominal mass palpated [Abdomen Soft] : soft [Oriented To Time, Place, And Person] : oriented to person, place, and time [] : no hepatosplenomegaly

## 2023-03-15 NOTE — ASSESSMENT
[FreeTextEntry1] : A low acid / reflux diet was discussed in great detail including  not smoking, not drinking alcohol, and not consuming foods that irritate the esophagus. It is helpful to eat small meals throughout the day instead of large meals. You should avoid eating before bedtime or lying down after you eat. It can be helpful to raise the head of your bed six inches. Additionally, you should maintain a healthy weight and good posture.. The patient was given written material to take home and review.\par \par  The risks benefits alternatives and complications of the procedure/s were explained to the patient at length. The patient was agreeable and we will proceed.\par \par I spent 45 minutes reviewing the patients records prior to arrival, with patient , and reviewing records after visit. All prior testing reviewed at length. All questions were answered.\par \par

## 2023-06-12 NOTE — ED ADULT NURSE NOTE - CAS EDP DISCH TYPE
Home Spironolactone Pregnancy And Lactation Text: This medication can cause feminization of the male fetus and should be avoided during pregnancy. The active metabolite is also found in breast milk.

## 2023-07-06 ENCOUNTER — TRANSCRIPTION ENCOUNTER (OUTPATIENT)
Age: 28
End: 2023-07-06

## 2023-07-06 RX ORDER — SODIUM CHLORIDE 9 MG/ML
3 INJECTION INTRAMUSCULAR; INTRAVENOUS; SUBCUTANEOUS EVERY 8 HOURS
Refills: 0 | Status: DISCONTINUED | OUTPATIENT
Start: 2023-07-07 | End: 2023-07-21

## 2023-07-07 ENCOUNTER — APPOINTMENT (OUTPATIENT)
Dept: GASTROENTEROLOGY | Facility: HOSPITAL | Age: 28
End: 2023-07-07
Payer: MEDICAID

## 2023-07-07 ENCOUNTER — RESULT REVIEW (OUTPATIENT)
Age: 28
End: 2023-07-07

## 2023-07-07 ENCOUNTER — OUTPATIENT (OUTPATIENT)
Dept: OUTPATIENT SERVICES | Facility: HOSPITAL | Age: 28
LOS: 1 days | Discharge: HOME | End: 2023-07-07
Payer: MEDICAID

## 2023-07-07 VITALS
WEIGHT: 199.96 LBS | HEIGHT: 66 IN | SYSTOLIC BLOOD PRESSURE: 108 MMHG | RESPIRATION RATE: 16 BRPM | TEMPERATURE: 98 F | HEART RATE: 71 BPM | OXYGEN SATURATION: 98 % | DIASTOLIC BLOOD PRESSURE: 73 MMHG

## 2023-07-07 VITALS
SYSTOLIC BLOOD PRESSURE: 105 MMHG | DIASTOLIC BLOOD PRESSURE: 72 MMHG | OXYGEN SATURATION: 98 % | HEART RATE: 77 BPM | RESPIRATION RATE: 17 BRPM

## 2023-07-07 DIAGNOSIS — Z98.82 BREAST IMPLANT STATUS: Chronic | ICD-10-CM

## 2023-07-07 DIAGNOSIS — Z90.49 ACQUIRED ABSENCE OF OTHER SPECIFIED PARTS OF DIGESTIVE TRACT: Chronic | ICD-10-CM

## 2023-07-07 DIAGNOSIS — K21.9 GASTRO-ESOPHAGEAL REFLUX DISEASE WITHOUT ESOPHAGITIS: ICD-10-CM

## 2023-07-07 LAB — HCG UR QL: NEGATIVE — SIGNIFICANT CHANGE UP

## 2023-07-07 PROCEDURE — 88312 SPECIAL STAINS GROUP 1: CPT | Mod: 26

## 2023-07-07 PROCEDURE — 43239 EGD BIOPSY SINGLE/MULTIPLE: CPT

## 2023-07-07 PROCEDURE — 88305 TISSUE EXAM BY PATHOLOGIST: CPT | Mod: 26

## 2023-07-07 RX ORDER — ONDANSETRON 8 MG/1
4 TABLET, FILM COATED ORAL ONCE
Refills: 0 | Status: DISCONTINUED | OUTPATIENT
Start: 2023-07-07 | End: 2023-07-07

## 2023-07-07 RX ORDER — SODIUM CHLORIDE 9 MG/ML
500 INJECTION, SOLUTION INTRAVENOUS ONCE
Refills: 0 | Status: COMPLETED | OUTPATIENT
Start: 2023-07-07 | End: 2023-07-07

## 2023-07-07 RX ORDER — ACETAMINOPHEN 500 MG
650 TABLET ORAL ONCE
Refills: 0 | Status: DISCONTINUED | OUTPATIENT
Start: 2023-07-07 | End: 2023-07-21

## 2023-07-07 RX ORDER — OMEPRAZOLE 40 MG/1
40 CAPSULE, DELAYED RELEASE ORAL
Qty: 30 | Refills: 5 | Status: ACTIVE | COMMUNITY
Start: 2023-03-15 | End: 1900-01-01

## 2023-07-07 RX ADMIN — SODIUM CHLORIDE 1000 MILLILITER(S): 9 INJECTION, SOLUTION INTRAVENOUS at 14:04

## 2023-07-07 NOTE — PRE-OP CHECKLIST - PATIENT'S PERSONAL PROPERTY REMOVED
Implemented All Fall Risk Interventions:  [place] to call system. Call bell, personal items and telephone within reach. Instruct patient to call for assistance. Room bathroom lighting operational. Non-slip footwear when patient is off stretcher. Physically safe environment: no spills, clutter or unnecessary equipment. Stretcher in lowest position, wheels locked, appropriate side rails in place. Provide visual cue, wrist band, yellow gown, etc. Monitor gait and stability. Monitor for mental status changes and reorient to person, place, and time. Review medications for side effects contributing to fall risk. Reinforce activity limits and safety measures with patient and family. jewelry

## 2023-07-07 NOTE — ASU PATIENT PROFILE, ADULT - TEACHING/LEARNING CULTURAL CONSIDERATIONS
none Mohs Method Verbiage: An incision at a 90 degree angle following the standard Mohs approach was done and the specimen was harvested as a microscopic controlled layer.

## 2023-07-10 LAB — SURGICAL PATHOLOGY STUDY: SIGNIFICANT CHANGE UP

## 2023-07-11 DIAGNOSIS — B96.81 HELICOBACTER PYLORI [H. PYLORI] AS THE CAUSE OF DISEASES CLASSIFIED ELSEWHERE: ICD-10-CM

## 2023-07-11 DIAGNOSIS — K29.50 UNSPECIFIED CHRONIC GASTRITIS WITHOUT BLEEDING: ICD-10-CM

## 2023-07-11 DIAGNOSIS — K21.9 GASTRO-ESOPHAGEAL REFLUX DISEASE WITHOUT ESOPHAGITIS: ICD-10-CM

## 2024-03-28 ENCOUNTER — EMERGENCY (EMERGENCY)
Facility: HOSPITAL | Age: 29
LOS: 1 days | Discharge: HOME | End: 2024-03-28
Admitting: EMERGENCY MEDICINE
Payer: MEDICAID

## 2024-03-28 VITALS
HEIGHT: 66 IN | TEMPERATURE: 99 F | OXYGEN SATURATION: 95 % | HEART RATE: 89 BPM | WEIGHT: 190.04 LBS | RESPIRATION RATE: 17 BRPM | SYSTOLIC BLOOD PRESSURE: 128 MMHG | DIASTOLIC BLOOD PRESSURE: 80 MMHG

## 2024-03-28 DIAGNOSIS — Z98.82 BREAST IMPLANT STATUS: Chronic | ICD-10-CM

## 2024-03-28 DIAGNOSIS — Z90.49 ACQUIRED ABSENCE OF OTHER SPECIFIED PARTS OF DIGESTIVE TRACT: Chronic | ICD-10-CM

## 2024-03-28 LAB
FLUAV AG NPH QL: SIGNIFICANT CHANGE UP
FLUBV AG NPH QL: SIGNIFICANT CHANGE UP
RSV RNA NPH QL NAA+NON-PROBE: SIGNIFICANT CHANGE UP
SARS-COV-2 RNA SPEC QL NAA+PROBE: SIGNIFICANT CHANGE UP

## 2024-03-28 PROCEDURE — 99283 EMERGENCY DEPT VISIT LOW MDM: CPT

## 2024-03-28 PROCEDURE — 87637 SARSCOV2&INF A&B&RSV AMP PRB: CPT

## 2024-03-28 NOTE — ED ADULT TRIAGE NOTE - CHIEF COMPLAINT QUOTE
Pt c/o lump to R side of throat x yesterday, "scratchy". Denies pain, difficulty swallowing, cp, sob.

## 2024-03-28 NOTE — ED PROVIDER NOTE - PHYSICAL EXAMINATION
CONSTITUTIONAL: NAD   SKIN: Normal color and turgor.    HEAD: NC/AT.  EYES: Conjunctiva clear. Anicteric sclera.  ENT: Mild bilateral submandibular PATRICIA, R>L.  No cervical PATRICIA.  Airway clear. Normal voice. Suspected punctate scab to Little's area right nasal septum, no blood in nostril, no bleeding. No blood in throat. Tonsils appear normal. No pharyngeal erythema. Uvula midline.   RESPIRATORY:  Normal work of breathing. Lungs CTAB.  CARDIOVASCULAR:  RRR, S1S2. No M/R/G.      GI:  Abdomen soft, nontender.    MSK: Neck supple.  No LE edema or calf tenderness. No joint swelling or ROM limitation.  NEURO: Alert; CN: grossly intact. Speech clear.  DEGROOT. Gait steady.

## 2024-03-28 NOTE — ED PROVIDER NOTE - OBJECTIVE STATEMENT
27 yo PMHx PCOS c/o right sided submandibular lump.  pt states she was feeling "run down" over last weekend and took a couple of days off from work for it.  she also admits to chronic nasal congestion, had a right-sided nosebleed yesterday; this was the first time she's ever had a nosebleed - has not recurred.  yesterday she was doing gua sha lymphatic drainage when she felt a lump under right side of jaw.  has had no pain inside of her throat, no difficulty or pain with swallowing, no cough or difficulty breathing, no fever or chills, no other complaints.

## 2024-03-28 NOTE — ED ADULT NURSE NOTE - NSFALLUNIVINTERV_ED_ALL_ED
Bed/Stretcher in lowest position, wheels locked, appropriate side rails in place/Call bell, personal items and telephone in reach/Instruct patient to call for assistance before getting out of bed/chair/stretcher/Non-slip footwear applied when patient is off stretcher/[place] to call system/Physically safe environment - no spills, clutter or unnecessary equipment/Purposeful proactive rounding/Room/bathroom lighting operational, light cord in reach

## 2024-03-28 NOTE — ED PROVIDER NOTE - CARE PROVIDERS DIRECT ADDRESSES
[email or web address]

## 2024-03-28 NOTE — ED PROVIDER NOTE - IV ALTEPLASE INCLUSION HIDDEN
Outreach attempt was made to schedule an Annual Wellness Visit. This was the first attempt. Contact was not made, left message.  
show

## 2024-03-28 NOTE — ED ADULT NURSE NOTE - OBJECTIVE STATEMENT
Sore throat since yesterday. Denies dizziness, weakness, N/V/D, SOB, chest pain, or syncope. No fever or chills reported. Pt alert, oriented, and ambulatory at time of assessment.

## 2024-03-28 NOTE — ED PROVIDER NOTE - CARE PROVIDER_API CALL
Laya Rey  Internal Medicine  [address] [place] [address]  Phone: [phone]  Fax: [phone]  Follow Up Time:     Jessica Estrada  Otolaryngology  [address] [place] [address], [address]  [place] [address]  Phone: [phone]  Fax: [phone]  Follow Up Time:

## 2024-03-28 NOTE — ED ADULT NURSE NOTE - CAS TRG GENERAL AIRWAY, MLM
PATIENT INSTRUCTIONS    Treatment:  Post Injection Instructions Post Injection English   You have been given an injection (shot) at the [facility] Orthopa[facility] department.  Injections are given into joints, tendons, or soft tissue for the treatment of pain and inflammation.  The medicine is a corticosteroid, but is often called a steroid or cortisone shot.  The steroid used was Kenalog or Depo-Medrol.  A fast acting pain killer such as Lidocaine may be injected with the steroid to dull the pain for a few hours.  A long acting anesthetic, such as Marcaine, may also be injected with the steroid.  It may last up to 30 hours or wear off as soon as 6 hours.     The steroid begins to work in 24 to 48 hours and may take 2 weeks to reach full effect.     Take all of your regular medications, including pain medicine (unless instructed otherwise).     Some increased pain may occur in the first 24 hours after the injection.  You may apply a cold pack or ice to the injected area for 15 to 20 minutes every 1 to 2 hours for 24 hours to lessen the pain.     For people with diabetes, your blood sugar may be increased for 1 to 2 days. If you have any questions regarding your blood sugar, please contact your primary care physician.       Call your Orthopaedic physician if you develop any of the following symptoms:  Fever  Increased redness of injection site  If it is not better in 2 weeks     Follow-Up:  Please make an appointment with  Dr. Rupesh Phillips in 4 months           Time left: 9/15/2021 4:22 PM     Please note: 24 hour notice for cancellation of appointment is required.    You may receive a survey in the mail, or via the e-mail address that you have provided.  We would appreciate if you could fill out the survey and provide us with any feedback on your experience regarding your visit today. Thank you for allowing us to provide you with your health care needs.     Do not hesitate to call if you are experiencing  severe pain, worsening or change in your pain, have symptoms of infection (fever, warmth, redness, increased drainage), or have any other problem that concerns you ~ [phone] (or [phone] after hours).    Please remember when requesting refills on pain medication that the request should be made by Thursday at the latest. [facility] is open Monday-Friday, 8am-5pm, and closed on the weekends.  No narcotic refills will be filled after hours.    Additional Educational Resources:  For additional resources regarding your symptoms, diagnosis, or further health information, please visit the Health Resources section on [email or web address] or the Online Health Resources section in [employer].         Patent

## 2024-03-28 NOTE — ED PROVIDER NOTE - CLINICAL SUMMARY MEDICAL DECISION MAKING FREE TEXT BOX
Submandibular gland swelling, very mild.  No airway/breathing issues. Pt is HDS, nontoxic appearing, in NAD.  Felt run down few days, likely viral in nature.  One-time right sided epistaxis yesterday, suspect anterior nosebleed. No cough or pulm symptoms. No fever, no neck stiffness. No other acute complaints. Will swab for common viruses (flu-covid-rsv). Submandibular gland swelling, very mild.  No airway/breathing issues. No pharyngitis. Pt is HDS, nontoxic appearing, in NAD.  Felt run down few days, likely viral in nature.  One-time right sided epistaxis yesterday, suspect anterior nosebleed. No cough or pulm symptoms. No fever, no neck stiffness. No other acute complaints. Will swab for common viruses (flu-covid-rsv).

## 2024-03-28 NOTE — ED PROVIDER NOTE - NSFOLLOWUPINSTRUCTIONS_ED_ALL_ED_FT
We are testing for common viruses (flu-covid-RSV). Results should be available in the next few hours.  Please follow up with your primary care provider tomorrow or early next week. If the swelling does not resolve after 1-2 weeks, you will probably also need to see an ENT (ear nose throat) specialist, also known as an otolaryngologist.   Return to the Emergency Department if you have any new or worsening symptoms, or if you have any concerns.  =================  Lymphadenopathy    Lymphadenopathy means that your lymph glands are swollen or larger than normal. Lymph glands, also called lymph nodes, are collections of tissue that filter excess fluid, bacteria, viruses, and waste from your bloodstream. They are part of your body's disease-fighting system (immune system), which protects your body from germs.    There may be different causes of lymphadenopathy, depending on where it is in your body. Some types go away on their own. Lymphadenopathy can occur anywhere that you have lymph glands, including these areas:  Neck (cervical lymphadenopathy).  Chest (mediastinal lymphadenopathy).  Lungs (hilar lymphadenopathy).  Underarms (axillary lymphadenopathy).  Groin (inguinal lymphadenopathy).  When your immune system responds to germs, infection-fighting cells and fluid build up in your lymph glands. This causes some swelling and enlargement. If the lymph nodes do not go back to normal size after you have an infection or disease, your health care provider may do tests. These tests help to monitor your condition and find the reason why the glands are still swollen and enlarged.    Follow these instructions at home:    Get plenty of rest.  Your health care provider may recommend over-the-counter medicines for pain. Take over-the-counter and prescription medicines only as told by your health care provider.  If directed, apply heat to swollen lymph glands as often as told by your health care provider. Use the heat source that your health care provider recommends, such as a moist heat pack or a heating pad.  Place a towel between your skin and the heat source.  Leave the heat on for 20–30 minutes.  Remove the heat if your skin turns bright red. This is especially important if you are unable to feel pain, heat, or cold. You may have a greater risk of getting burned.  Check your affected lymph glands every day for changes. Check other lymph gland areas as told by your health care provider. Check for changes such as:  More swelling.  Sudden increase in size.  Redness or pain.  Hardness.  Keep all follow-up visits. This is important.  Contact a health care provider if you have:  Lymph glands that:  Are still swollen after 2 weeks.  Have suddenly gotten bigger or the swelling spreads.  Are red, painful, or hard.  Fluid leaking from the skin near an enlarged lymph gland.  Problems with breathing.  A fever, chills, or night sweats.  Fatigue.  A sore throat.  Pain in your abdomen.  Weight loss.  Get help right away if you have:  Severe pain.  Chest pain.  Shortness of breath.  These symptoms may represent a serious problem that is an emergency. Do not wait to see if the symptoms will go away. Get medical help right away. Call your local emergency services (911 in the [place] Do not drive yourself to the hospital.    Summary  Lymphadenopathy means that your lymph glands are swollen or larger than normal.  Lymph glands, also called lymph nodes, are collections of tissue that filter excess fluid, bacteria, viruses, and waste from the bloodstream. They are part of your body's disease-fighting system (immune system).  Lymphadenopathy can occur anywhere that you have lymph glands.  If the lymph nodes do not go back to normal size after you have an infection or disease, your health care provider may do tests to monitor your condition and find the reason why the glands are still swollen and enlarged.  Check your affected lymph glands every day for changes. Check other lymph gland areas as told by your health care provider.  This information is not intended to replace advice given to you by your health care provider. Make sure you discuss any questions you have with your health care provider.

## 2024-03-28 NOTE — ED PROVIDER NOTE - PATIENT PORTAL LINK FT
You can access the FollowMyHealth Patient Portal offered by [facility] by registering at the following website: [email or web address]. By joining [email or web address]’s FollowMyHealth portal, you will also be able to view your health information using other applications (apps) compatible with our system.

## 2024-03-31 DIAGNOSIS — Z20.822 CONTACT WITH AND (SUSPECTED) EXPOSURE TO COVID-19: ICD-10-CM

## 2024-03-31 DIAGNOSIS — R59.0 LOCALIZED ENLARGED LYMPH NODES: ICD-10-CM

## 2024-03-31 DIAGNOSIS — Z91.013 ALLERGY TO SEAFOOD: ICD-10-CM

## 2024-03-31 DIAGNOSIS — R09.81 NASAL CONGESTION: ICD-10-CM

## 2024-03-31 DIAGNOSIS — R07.0 PAIN IN THROAT: ICD-10-CM

## 2024-04-16 ENCOUNTER — APPOINTMENT (OUTPATIENT)
Dept: INTERNAL MEDICINE | Facility: CLINIC | Age: 29
End: 2024-04-16

## 2024-06-06 NOTE — ED PROVIDER NOTE - OBJECTIVE STATEMENT
26F hx anxiety and depression here with bilat ankle injuries, playing volleyball and rolled one then the other. Did not hit head, no loc. Wrapped by AT prior to arrival. Given 5mg morphine x2 prior to arrival, now feels no pain. No presyncopal sx, cp, sob, n/v/d. Denies pain or injury other than ankles.
show

## 2025-04-21 NOTE — ED PROVIDER NOTE - CPE EDP ENMT NORM
Please advise-    Patient just saw Susannah on 4/9/2025 for clearance to go into living facility. Okay to push back MWV?    normal...

## (undated) DEVICE — KIT ENDO PROCEDURE CUST W/VLV

## (undated) DEVICE — GLV 9 PROTEXIS (WHITE)

## (undated) DEVICE — Device

## (undated) DEVICE — BITE BLOCK ADULT 20 X 27MM (GREEN)

## (undated) DEVICE — FORCEP RADIAL JAW 4 W NDL 2.4MM 2.8MM 240CM ORANGE DISP

## (undated) DEVICE — TUBING HYBRID CO2

## (undated) DEVICE — GLV 7.5 PROTEXIS (WHITE)